# Patient Record
Sex: MALE | Race: WHITE | Employment: FULL TIME | RURAL
[De-identification: names, ages, dates, MRNs, and addresses within clinical notes are randomized per-mention and may not be internally consistent; named-entity substitution may affect disease eponyms.]

---

## 2017-08-04 ENCOUNTER — OFFICE VISIT (OUTPATIENT)
Dept: FAMILY MEDICINE CLINIC | Age: 67
End: 2017-08-04

## 2017-08-04 VITALS
WEIGHT: 168 LBS | HEART RATE: 60 BPM | RESPIRATION RATE: 16 BRPM | OXYGEN SATURATION: 98 % | HEIGHT: 70 IN | SYSTOLIC BLOOD PRESSURE: 158 MMHG | DIASTOLIC BLOOD PRESSURE: 86 MMHG | BODY MASS INDEX: 24.05 KG/M2

## 2017-08-04 DIAGNOSIS — E78.2 MIXED HYPERLIPIDEMIA: ICD-10-CM

## 2017-08-04 DIAGNOSIS — Z23 ENCOUNTER FOR IMMUNIZATION: ICD-10-CM

## 2017-08-04 DIAGNOSIS — I10 ESSENTIAL HYPERTENSION: ICD-10-CM

## 2017-08-04 DIAGNOSIS — Z00.00 MEDICARE ANNUAL WELLNESS VISIT, SUBSEQUENT: Primary | ICD-10-CM

## 2017-08-04 DIAGNOSIS — R35.1 NOCTURIA: ICD-10-CM

## 2017-08-04 RX ORDER — LOSARTAN POTASSIUM 50 MG/1
50 TABLET ORAL
Qty: 90 TAB | Refills: 4 | Status: SHIPPED | OUTPATIENT
Start: 2017-08-04 | End: 2018-10-15 | Stop reason: SDUPTHER

## 2017-08-04 RX ORDER — LOSARTAN POTASSIUM 50 MG/1
50 TABLET ORAL
Qty: 14 TAB | Refills: 1 | Status: SHIPPED | OUTPATIENT
Start: 2017-08-04 | End: 2017-08-04 | Stop reason: SDUPTHER

## 2017-08-04 NOTE — PROGRESS NOTES
Chief Complaint   Patient presents with   [facility] Well Male         HPI:      Dhiraj Camacho is a 78 y.o. male who has a long history of hyperlipidemia. A few years ago, he had a calcium score heart scan which showed some calcifications. He was placed on simvastatin 10 mg daily and has not had any side effects. His family history is remarkable for longevity. Mr. Michelle Aguilar walks every day and takes simvastatin as his only medication.       He had an episode of rectal bleeding which has Not recurred since stopping aspirin. He has had 2 normal colonoscopies. There is no family history colon cancer. No Known Allergies    Current Outpatient Prescriptions   Medication Sig    simvastatin (ZOCOR) 10 mg tablet TAKE 1 TABLET NIGHTLY    vit B Cmplx 3-FA-Vit C-Biotin (NEPHRO CARRILLO RX) 1- mg-mg-mcg tablet Take 1 Tab by mouth daily.  calcium polycarbophil (FIBERCON) 625 mg tablet Take 625 mg by mouth daily.  GLUCOSAMINE HCL/CHONDR CALDWELL A NA (GLUCOSAMINE-CHONDROITIN) 750-600 mg tab Take  by mouth.  cholecalciferol, vitamin D3, (VITAMIN D3) 2,000 unit tab Take  by mouth. No current facility-administered medications for this visit. Past Medical History:   Diagnosis Date    Mixed hyperlipidemia          ROS:  Denies fever, chills, cough, chest pain, SOB,  nausea, vomiting, or diarrhea. Denies wt loss, wt gain, hemoptysis, hematochezia or melena. Physical Examination:    /86 (BP 1 Location: Left arm, BP Patient Position: Sitting)  Pulse 60  Resp 16  Ht 5' 10\" (1.778 m)  Wt 168 lb (76.2 kg)  SpO2 98%  BMI 24.11 kg/m2    General: Alert and Ox3, Fluent speech  HEENT:  NC/AT, EOMI, OP: clear  Neck:  Supple, no adenopathy, JVD, mass or bruit  Chest:  Clear to Ausculation, without wheezes, rales, rubs or ronchi  Cardiac: RRR  Abdomen:  +BS, soft, nontender without palpable HSM  Extremities:  No cyanosis, clubbing or edema  Neurologic:  Ambulatory without assist, CN 2-12 grossly intact.   Moves all extremities. Skin: no rash  Lymphadenopathy: no cervical or supraclavicular nodes      ASSESSMENT AND PLAN:     1. Hyperlipidemia:He is doing very well with simvastatin. I have asked him to remain on this and we will check his labs today including a CBC, CMP, lipid profile, as well as a PSA.     2. Screening for prostate cancer today with a PSA. 3.  HTN:  This is real.  I checked his BP standing in both arms while standing obtaining values of 191-286 systolic. Check BP at home. Bring cuff in at next visit. Goal SBP < 140. Orders Placed This Encounter    PNEUMOCOCCAL POLYSACCHARIDE VACCINE, 23-VALENT, ADULT OR IMMUNOSUPPRESSED PT DOSE,    ADMIN PNEUMOCOCCAL VACCINE       Ramon Agarwal MD, FACP        ______________________________________________________________________    Izabel Lorena is a 78 y.o. male and presents for annual Medicare Wellness Visit. Problem List: Reviewed with patient and discussed risk factors. Patient Active Problem List   Diagnosis Code    Mixed hyperlipidemia E78.2       Current medical providers:  Patient Care Team:  Leon Ruffin MD as PCP - General (Internal Medicine)    PM, , Medications/Allergies: reviewed, on chart. Male Alcohol Screening: On any occasion during the past 3 months, have you had more than 4 drinks containing alcohol? No    Do you average more than 14 drinks per week? No    ROS:  Constitutional: No fever, chills or weight loss  Respiratory: No cough, SOB   CV: No chest pain or Palpitations    Objective:  Visit Vitals    /86 (BP 1 Location: Left arm, BP Patient Position: Sitting)    Pulse 60    Resp 16    Ht 5' 10\" (1.778 m)    Wt 168 lb (76.2 kg)    SpO2 98%    BMI 24.11 kg/m2    Body mass index is 24.11 kg/(m^2). Assessment of cognitive impairment: Alert and oriented x 3    Depression Screen: No flowsheet data found. Fall Risk Assessment:  No flowsheet data found.     Functional Ability:   Does the patient exhibit a steady gait? yes   How long did it take the patient to get up and walk from a sitting position? 12 sec   Is the patient self reliant?  (ie can do own laundry, meals, household chores)  yes     Does the patient handle his/her own medications? yes     Does the patient handle his/her own money? yes     Is the patients home safe (ie good lighting, handrails on stairs and bath, etc.)? yes     Did you notice or did patient express any hearing difficulties? yes     Did you notice or did patient express any vision difficulties? no       Advance Care Planning:   Patient was offered the opportunity to discuss advance care planning:  yes     Does patient have an Advance Directive:  yes   If no, did you provide information on Caring Connections?  no       Plan:      Orders Placed This Encounter    PNEUMOCOCCAL POLYSACCHARIDE VACCINE, 23-VALENT, ADULT OR IMMUNOSUPPRESSED PT DOSE,    [address] Maintenance   Topic Date Due    Hepatitis C Screening  1950    COLONOSCOPY  05/17/1969    GLAUCOMA SCREENING Q2Y  05/17/2016    Pneumococcal 65+ Low/Medium Risk (2 of 2 - PPSV23) 08/02/2017    INFLUENZA AGE 9 TO ADULT  10/30/2017 (Originally 8/1/2017)    MEDICARE YEARLY EXAM  08/05/2018    DTaP/Tdap/Td series (2 - Td) 08/04/2027    ZOSTER VACCINE AGE 60>  Completed       *Patient verbalized understanding and agreement with the plan. A copy of the After Visit Summary with personalized health plan was given to the patient today.

## 2017-08-04 NOTE — ACP (ADVANCE CARE PLANNING)
He has an advanced directive and will bring this in for scanning. In the event that he is unable to speak for himself, please contact his wife, Piper Johnson; she can be reached at [phone].     Shikha Chin

## 2017-08-04 NOTE — MR AVS SNAPSHOT
Visit Information Date & Time Provider Department Dept. Phone Encounter #  
 8/4/2017  7:30 AM Jackelin Packer MD [address] [place] [address] [id number] Follow-up Instructions Return in about 1 year (around 8/4/2018). Follow-up and Disposition History Your Appointments 10/4/2017  9:00 AM  
ESTABLISHED PATIENT with Jackelin Packer MD  
[address] ([facility]) Appt Note: 2mo fu  
 [address] [facility] [address] [facility] [address] [phone]  
  
   
 [address] [facility] [address] [facility] [address] Upcoming Health Maintenance Date Due Hepatitis C Screening 1950 COLONOSCOPY 5/17/1969 GLAUCOMA SCREENING Q2Y 5/17/2016 Pneumococcal 65+ Low/Medium Risk (2 of 2 - PPSV23) 8/2/2017 INFLUENZA AGE 9 TO ADULT 10/30/2017* MEDICARE YEARLY EXAM 8/5/2018 DTaP/Tdap/Td series (2 - Td) 8/4/2027 *Topic was postponed. The date shown is not the original due date. Allergies as of 8/4/2017  Review Complete On: 8/4/2017 By: Jackelin Packer MD  
 No Known Allergies Current Immunizations  Reviewed on 8/4/2017 Name Date Influenza High Dose Vaccine PF 11/15/2016 Influenza Vaccine 10/21/2015, 9/26/2014 Pneumococcal Conjugate (PCV-13) 8/2/2016  8:32 AM  
 Pneumococcal Polysaccharide (PPSV-23) 8/4/2017  8:24 AM  
 Td 6/9/2014 Varicella Virus Vaccine 6/30/2013 Reviewed by Rusty Kilgore on 8/4/2017 at  8:25 AM  
You Were Diagnosed With   
  
 Codes Comments Medicare annual wellness visit, subsequent    -  Primary ICD-10-CM: Z00.00 ICD-9-CM: V70.0 Encounter for immunization     ICD-10-CM: Z63 ICD-9-CM: V03.89 Mixed hyperlipidemia     ICD-10-CM: E78.2 ICD-9-CM: 272.2 Nocturia     ICD-10-CM: R35.1 ICD-9-CM: 788.43 Essential hypertension     ICD-10-CM: I10 
ICD-9-CM: 401.9 Vitals BP Pulse Resp Height(growth percentile) Weight(growth percentile) SpO2 158/86 (BP 1 Location: Left arm, BP Patient Position: Sitting) 60 16 5' 10\" (1.778 m) 168 lb (76.2 kg) 98% BMI Smoking Status 24.11 kg/m2 Former Smoker BMI and BSA Data Body Mass Index Body Surface Area  
 24.11 kg/m 2 1.94 m 2 Preferred Pharmacy Pharmacy Name Phone [address] [phone] Your Updated Medication List  
  
   
This list is accurate as of: 8/4/17  8:32 AM.  Always use your most recent med list.  
  
  
  
  
 calcium polycarbophil 625 mg tablet Commonly known as:  Zana Hammer Take 625 mg by mouth daily. glucosamine-chondroitin 750-600 mg Tab Take  by mouth.  
  
 losartan 50 mg tablet Commonly known as:  COZAAR Take 1 Tab by mouth nightly. Indications: hypertension  
  
 simvastatin 10 mg tablet Commonly known as:  ZOCOR  
TAKE 1 TABLET NIGHTLY  
  
 vit B Cmplx 3-FA-Vit C-Biotin 1- mg-mg-mcg tablet Commonly known as:  NEPHRO CARRILLO RX Take 1 Tab by mouth daily. VITAMIN D3 2,000 unit Tab Generic drug:  cholecalciferol (vitamin D3) Take  by mouth. Prescriptions Sent to Pharmacy Refills  
 losartan (COZAAR) 50 mg tablet 4 Sig: Take 1 Tab by mouth nightly. Indications: hypertension Class: Normal  
 Pharmacy: [address], [address] Ph #: [phone] Route: Oral  
  
We Performed the Following ADMIN PNEUMOCOCCAL VACCINE [ [facility]] CBC WITH AUTOMATED DIFF [15982 CPT(R)] LIPID PANEL [27903 CPT(R)] METABOLIC PANEL, COMPREHENSIVE [41146 CPT(R)] PNEUMOCOCCAL POLYSACCHARIDE VACCINE, 23-VALENT, ADULT OR IMMUNOSUPPRESSED PT DOSE, [68330 CPT(R)] PROSTATE SPECIFIC AG (PSA) [id number] CPT(R)] Follow-up Instructions Return in about 1 year (around 8/4/2018). Introducing [facility]! Dear Jennifer Peng: Thank you for requesting a Indigio account. Our records indicate that you already have an active Indigio account. You can access your account anytime at [email or web address] Me [email or web address]. [employer]/Get Me Listed Did you know that you can access your hospital and ER discharge instructions at any time in Indigio? You can also review all of your test results from your hospital stay or ER visit. Additional Information If you have questions, please visit the Frequently Asked Questions section of the Indigio website at [email or web address] Me Listed. [employer]/Get Me Listed/. Remember, Indigio is NOT to be used for urgent needs. For medical emergencies, dial 911. Now available from your iPhone and Android! Please provide this summary of care documentation to your next provider. Your primary care clinician is listed as Gini Dawson. If you have any questions after today's visit, please call [phone].

## 2017-08-05 LAB
ALBUMIN SERPL-MCNC: 4.6 G/DL (ref 3.6–4.8)
ALBUMIN/GLOB SERPL: 2 {RATIO} (ref 1.2–2.2)
ALP SERPL-CCNC: 71 IU/L (ref 39–117)
ALT SERPL-CCNC: 13 IU/L (ref 0–44)
AST SERPL-CCNC: 23 IU/L (ref 0–40)
BASOPHILS # BLD AUTO: 0 X10E3/UL (ref 0–0.2)
BASOPHILS NFR BLD AUTO: 0 %
BILIRUB SERPL-MCNC: 0.4 MG/DL (ref 0–1.2)
BUN SERPL-MCNC: 9 MG/DL (ref 8–27)
BUN/CREAT SERPL: 12 (ref 10–24)
CALCIUM SERPL-MCNC: 9.6 MG/DL (ref 8.6–10.2)
CHLORIDE SERPL-SCNC: 99 MMOL/L (ref 96–106)
CHOLEST SERPL-MCNC: 176 MG/DL (ref 100–199)
CO2 SERPL-SCNC: 23 MMOL/L (ref 18–29)
CREAT SERPL-MCNC: 0.78 MG/DL (ref 0.76–1.27)
EOSINOPHIL # BLD AUTO: 0.2 X10E3/UL (ref 0–0.4)
EOSINOPHIL NFR BLD AUTO: 2 %
ERYTHROCYTE [DISTWIDTH] IN BLOOD BY AUTOMATED COUNT: 13.3 % (ref 12.3–15.4)
GLOBULIN SER CALC-MCNC: 2.3 G/DL (ref 1.5–4.5)
GLUCOSE SERPL-MCNC: 97 MG/DL (ref 65–99)
HCT VFR BLD AUTO: 43.2 % (ref 37.5–51)
HDLC SERPL-MCNC: 76 MG/DL
HGB BLD-MCNC: 14.3 G/DL (ref 12.6–17.7)
IMM GRANULOCYTES # BLD: 0 X10E3/UL (ref 0–0.1)
IMM GRANULOCYTES NFR BLD: 0 %
LDLC SERPL CALC-MCNC: 85 MG/DL (ref 0–99)
LYMPHOCYTES # BLD AUTO: 2.1 X10E3/UL (ref 0.7–3.1)
LYMPHOCYTES NFR BLD AUTO: 27 %
MCH RBC QN AUTO: 31.6 PG (ref 26.6–33)
MCHC RBC AUTO-ENTMCNC: 33.1 G/DL (ref 31.5–35.7)
MCV RBC AUTO: 95 FL (ref 79–97)
MONOCYTES # BLD AUTO: 0.7 X10E3/UL (ref 0.1–0.9)
MONOCYTES NFR BLD AUTO: 9 %
NEUTROPHILS # BLD AUTO: 4.6 X10E3/UL (ref 1.4–7)
NEUTROPHILS NFR BLD AUTO: 62 %
PLATELET # BLD AUTO: 195 X10E3/UL (ref 150–379)
POTASSIUM SERPL-SCNC: 4.1 MMOL/L (ref 3.5–5.2)
PROT SERPL-MCNC: 6.9 G/DL (ref 6–8.5)
PSA SERPL-MCNC: 1.3 NG/ML (ref 0–4)
RBC # BLD AUTO: 4.53 X10E6/UL (ref 4.14–5.8)
SODIUM SERPL-SCNC: 138 MMOL/L (ref 134–144)
TRIGL SERPL-MCNC: 74 MG/DL (ref 0–149)
VLDLC SERPL CALC-MCNC: 15 MG/DL (ref 5–40)
WBC # BLD AUTO: 7.5 X10E3/UL (ref 3.4–10.8)

## 2017-10-04 ENCOUNTER — OFFICE VISIT (OUTPATIENT)
Dept: FAMILY MEDICINE CLINIC | Age: 67
End: 2017-10-04

## 2017-10-04 VITALS
OXYGEN SATURATION: 98 % | HEART RATE: 62 BPM | BODY MASS INDEX: 23.35 KG/M2 | DIASTOLIC BLOOD PRESSURE: 66 MMHG | RESPIRATION RATE: 16 BRPM | SYSTOLIC BLOOD PRESSURE: 144 MMHG | WEIGHT: 162.7 LBS

## 2017-10-04 DIAGNOSIS — I10 ESSENTIAL HYPERTENSION: Primary | ICD-10-CM

## 2017-10-04 NOTE — MR AVS SNAPSHOT
Visit Information Date & Time Provider Department Dept. Phone Encounter #  
 10/4/2017  9:00 AM Felisha Curiel, [address] [id number] Upcoming Health Maintenance Date Due Hepatitis C Screening 1950 INFLUENZA AGE 9 TO ADULT 10/30/2017* MEDICARE YEARLY EXAM 8/5/2018 GLAUCOMA SCREENING Q2Y 8/8/2019 COLONOSCOPY 4/20/2022 DTaP/Tdap/Td series (2 - Td) 8/4/2027 *Topic was postponed. The date shown is not the original due date. Allergies as of 10/4/2017  Review Complete On: 10/4/2017 By: Felisha Curiel MD  
 No Known Allergies Current Immunizations  Reviewed on 8/4/2017 Name Date Influenza High Dose Vaccine PF 11/15/2016 Influenza Vaccine 10/21/2015, 9/26/2014 Pneumococcal Conjugate (PCV-13) 8/2/2016  8:32 AM  
 Pneumococcal Polysaccharide (PPSV-23) 8/4/2017  8:24 AM  
 Td 6/9/2014 Varicella Virus Vaccine 6/30/2013 Not reviewed this visit You Were Diagnosed With   
  
 Codes Comments Essential hypertension    -  Primary ICD-10-CM: I10 
ICD-9-CM: 401.9 Vitals BP Pulse Resp Weight(growth percentile) SpO2 BMI  
 144/66 (BP 1 Location: Left arm, BP Patient Position: Sitting) 62 16 162 lb 11.2 oz (73.8 kg) 98% 23.35 kg/m2 Smoking Status Former Smoker BMI and BSA Data Body Mass Index Body Surface Area  
 23.35 kg/m 2 1.91 m 2 Preferred Pharmacy Pharmacy Name Phone [address] [facility] [phone] Your Updated Medication List  
  
   
This list is accurate as of: 10/4/17  9:41 AM.  Always use your most recent med list.  
  
  
  
  
 calcium polycarbophil 625 mg tablet Commonly known as:  Mel Jones Take 625 mg by mouth daily. glucosamine-chondroitin 750-600 mg Tab Take  by mouth.  
  
 losartan 50 mg tablet Commonly known as:  COZAAR  
 Take 1 Tab by mouth nightly. Indications: hypertension  
  
 simvastatin 10 mg tablet Commonly known as:  ZOCOR  
TAKE 1 TABLET NIGHTLY  
  
 vit B Cmplx 3-FA-Vit C-Biotin 1- mg-mg-mcg tablet Commonly known as:  NEPHRO CARRILLO RX Take 1 Tab by mouth daily. VITAMIN D3 2,000 unit Tab Generic drug:  cholecalciferol (vitamin D3) Take  by mouth. Introducing [facility]! Dear Rina Velarde: Thank you for requesting a Secure Command account. Our records indicate that you already have an active Secure Command account. You can access your account anytime at [email or web address]. [employer] Did you know that you can access your hospital and ER discharge instructions at any time in Secure Command? You can also review all of your test results from your hospital stay or ER visit. Additional Information If you have questions, please visit the Frequently Asked Questions section of the Secure Command website at https://[employer]. [employer]/. Remember, Secure Command is NOT to be used for urgent needs. For medical emergencies, dial 911. Now available from your iPhone and Android! Please provide this summary of care documentation to your next provider. Your primary care clinician is listed as Nain Palencia. If you have any questions after today's visit, please call [phone].

## 2017-10-04 NOTE — PROGRESS NOTES
Chief Complaint   Patient presents with    Hypertension         HPI:      Carolyne Pereyra is a 78 y.o. male. Seen 2 months ago for SAWV and noted to be hypertensive. Instructed to check BP at home. New Issues:  Doing well on Losartan. Not dizzy. No back pain. Home BP values reviewed with patient today and these are all < 140. No Known Allergies    Current Outpatient Prescriptions   Medication Sig    losartan (COZAAR) 50 mg tablet Take 1 Tab by mouth nightly. Indications: hypertension    simvastatin (ZOCOR) 10 mg tablet TAKE 1 TABLET NIGHTLY    vit B Cmplx 3-FA-Vit C-Biotin (NEPHRO CARRILLO RX) 1- mg-mg-mcg tablet Take 1 Tab by mouth daily.  calcium polycarbophil (FIBERCON) 625 mg tablet Take 625 mg by mouth daily.  GLUCOSAMINE HCL/CHONDR CALDWELL A NA (GLUCOSAMINE-CHONDROITIN) 750-600 mg tab Take  by mouth.  cholecalciferol, vitamin D3, (VITAMIN D3) 2,000 unit tab Take  by mouth. No current facility-administered medications for this visit. Past Medical History:   Diagnosis Date    Mixed hyperlipidemia          ROS:  Denies fever, chills, cough, chest pain, SOB,  nausea, vomiting, or diarrhea. Denies wt loss, wt gain, hemoptysis, hematochezia or melena. Physical Examination:    /66 (BP 1 Location: Left arm, BP Patient Position: Sitting)  Pulse 62  Resp 16  Wt 162 lb 11.2 oz (73.8 kg)  SpO2 98%  BMI 23.35 kg/m2    General: Alert and Ox3, Fluent speech  HEENT:  NC/AT, EOMI, OP: clear  Neck:  Supple, no adenopathy, JVD, mass or bruit  Chest:  Clear to Ausculation, without wheezes, rales, rubs or ronchi  Cardiac: RRR  Abdomen:  +BS, soft, nontender without palpable HSM  Extremities:  No cyanosis, clubbing or edema  Neurologic:  Ambulatory without assist, CN 2-12 grossly intact. Moves all extremities. Skin: no rash  Lymphadenopathy: no cervical or supraclavicular nodes      ASSESSMENT AND PLAN:     1. Essential HTN:  Well controlled on Losartan without side effects. Continue current meds and RTC in a year. No orders of the defined types were placed in this encounter.       Joseph Norris MD, [address]

## 2017-10-05 ENCOUNTER — CLINICAL SUPPORT (OUTPATIENT)
Dept: FAMILY MEDICINE CLINIC | Age: 67
End: 2017-10-05

## 2017-10-05 DIAGNOSIS — Z23 ENCOUNTER FOR IMMUNIZATION: Primary | ICD-10-CM

## 2017-10-05 NOTE — MR AVS SNAPSHOT
Visit Information Date & Time Provider Department Dept. Phone Encounter #  
 10/5/2017 11:15 AM [facility] [address] [id number] Upcoming Health Maintenance Date Due Hepatitis C Screening 1950 MEDICARE YEARLY EXAM 8/5/2018 GLAUCOMA SCREENING Q2Y 8/8/2019 COLONOSCOPY 4/20/2022 DTaP/Tdap/Td series (2 - Td) 8/4/2027 Allergies as of 10/5/2017  Review Complete On: 10/4/2017 By: Anais Atkins MD  
 No Known Allergies Current Immunizations  Reviewed on 8/4/2017 Name Date Influenza High Dose Vaccine PF 10/5/2017 11:33 AM, 11/15/2016 Influenza Vaccine 10/21/2015, 9/26/2014 Pneumococcal Conjugate (PCV-13) 8/2/2016  8:32 AM  
 Pneumococcal Polysaccharide (PPSV-23) 8/4/2017  8:24 AM  
 Td 6/9/2014 Varicella Virus Vaccine 6/30/2013 Not reviewed this visit You Were Diagnosed With   
  
 Codes Comments Encounter for immunization    -  Primary ICD-10-CM: C35 ICD-9-CM: V03.89 Vitals Smoking Status Former Smoker Preferred Pharmacy Pharmacy Name Phone [address], [facility] [phone] Your Updated Medication List  
  
   
This list is accurate as of: 10/5/17 11:45 AM.  Always use your most recent med list.  
  
  
  
  
 calcium polycarbophil 625 mg tablet Commonly known as:  [facility] Take 625 mg by mouth daily. glucosamine-chondroitin 750-600 mg Tab Take  by mouth.  
  
 losartan 50 mg tablet Commonly known as:  COZAAR Take 1 Tab by mouth nightly. Indications: hypertension  
  
 simvastatin 10 mg tablet Commonly known as:  ZOCOR  
TAKE 1 TABLET NIGHTLY  
  
 vit B Cmplx 3-FA-Vit C-Biotin 1- mg-mg-mcg tablet Commonly known as:  NEPHRO CARRILLO RX Take 1 Tab by mouth daily. VITAMIN D3 2,000 unit Tab Generic drug:  cholecalciferol (vitamin D3) Take  by mouth. We Performed the Following INFLUENZA VIRUS VACCINE, HIGH DOSE SEASONAL, PRESERVATIVE FREE [28602 CPT(R)] Introducing [facility]! Dear Abelardo Soni: Thank you for requesting a [employer] account. Our records indicate that you already have an active [employer] account. You can access your account anytime at [email or web address] Did you know that you can access your hospital and ER discharge instructions at any time in [employer]? You can also review all of your test results from your hospital stay or ER visit. Additional Information If you have questions, please visit the Frequently Asked Questions section of the [employer] website at [email or web address] Remember, The Lo[employer] Tea is NOT to be used for urgent needs. For medical emergencies, dial 911. Now available from your iPhone and Android! Please provide this summary of care documentation to your next provider. Your primary care clinician is listed as Charbel Grave. If you have any questions after today's visit, please call [phone].

## 2018-02-19 ENCOUNTER — OFFICE VISIT (OUTPATIENT)
Dept: FAMILY MEDICINE CLINIC | Age: 68
End: 2018-02-19

## 2018-02-19 VITALS
HEIGHT: 70 IN | OXYGEN SATURATION: 99 % | SYSTOLIC BLOOD PRESSURE: 132 MMHG | WEIGHT: 163.4 LBS | BODY MASS INDEX: 23.39 KG/M2 | TEMPERATURE: 97.9 F | DIASTOLIC BLOOD PRESSURE: 78 MMHG | HEART RATE: 63 BPM | RESPIRATION RATE: 18 BRPM

## 2018-02-19 DIAGNOSIS — R09.89 CHEST CONGESTION: Primary | ICD-10-CM

## 2018-02-19 RX ORDER — ALBUTEROL SULFATE 0.83 MG/ML
2.5 SOLUTION RESPIRATORY (INHALATION) ONCE
Qty: 1 EACH | Refills: 0
Start: 2018-02-19 | End: 2018-02-19

## 2018-02-19 NOTE — MR AVS SNAPSHOT
[address] 
[phone] Patient: Fernando White MRN: [id number] Visit Information Date & Time Provider Department Dept. Phone Encounter #  
 2/19/2018  8:45 AM ANGELES Ignacio [facility] [address] [facility] [phone] [id number] Upcoming Health Maintenance Date Due Hepatitis C Screening 1950 MEDICARE YEARLY EXAM 8/5/2018 GLAUCOMA SCREENING Q2Y 8/8/2019 COLONOSCOPY 4/20/2022 DTaP/Tdap/Td series (2 - Td) 8/4/2027 Allergies as of 2/19/2018  Review Complete On: 2/19/2018 By: Krystina Morales RN No Known Allergies Current Immunizations  Reviewed on 8/4/2017 Name Date Influenza High Dose Vaccine PF 10/5/2017 11:33 AM, 11/15/2016 Influenza Vaccine 10/21/2015, 9/26/2014 Pneumococcal Conjugate (PCV-13) 8/2/2016  8:32 AM  
 Pneumococcal Polysaccharide (PPSV-23) 8/4/2017  8:24 AM  
 Td 6/9/2014 Varicella Virus Vaccine 6/30/2013 Not reviewed this visit You Were Diagnosed With   
  
 Codes Comments Chest congestion    -  Primary ICD-10-CM: R09.89 ICD-9-CM: 786.9 BMI 23.0-23.9, adult     ICD-10-CM: Z68.23 
ICD-9-CM: V85.1 Vitals BP Pulse Temp Resp Height(growth percentile) 132/78 (BP 1 Location: Left arm, BP Patient Position: Sitting) 63 97.9 °F (36.6 °C) (Temporal) 18 5' 10\" (1.778 m) Weight(growth percentile) SpO2 BMI Smoking Status 163 lb 6.4 oz (74.1 kg) 99% 23.45 kg/m2 Former Smoker Vitals History BMI and BSA Data Body Mass Index Body Surface Area  
 23.45 kg/m 2 1.91 m 2 Preferred Pharmacy Pharmacy Name Phone [address] [phone] Your Updated Medication List  
  
   
This list is accurate as of: 2/19/18 11:01 AM.  Always use your most recent med list.  
  
  
  
  
 albuterol 2.5 mg /3 mL (0.083 %) nebulizer solution Commonly known as:  PROVENTIL VENTOLIN  
3 mL by Nebulization route once for 1 dose. calcium polycarbophil 625 mg tablet Commonly known as:  Samantha Clancy Take 625 mg by mouth daily. glucosamine-chondroitin 750-600 mg Tab Take  by mouth.  
  
 losartan 50 mg tablet Commonly known as:  COZAAR Take 1 Tab by mouth nightly. Indications: hypertension PROBIOTIC 4X 10-15 mg Tbec Generic drug:  B.infantis-B.ani-B.long-B.bifi Take  by mouth. simvastatin 10 mg tablet Commonly known as:  ZOCOR  
TAKE 1 TABLET NIGHTLY  
  
 vit B Cmplx 3-FA-Vit C-Biotin 1- mg-mg-mcg tablet Commonly known as:  NEPHRO CARRILLO RX Take 1 Tab by mouth daily. VITAMIN D3 2,000 unit Tab Generic drug:  cholecalciferol (vitamin D3) Take  by mouth. We Performed the Following ALBUTEROL, INHAL. SOL., FDA-APPROVED FINAL, NON-COMPOUND UNIT DOSE, 1 MG [ [facility]] Introducing [facility]! Dear Duane Comes: Thank you for requesting a LaserLeap account. Our records indicate that you already have an active LaserLeap account. You can access your account anytime at [email or web address] Did you know that you can access your hospital and ER discharge instructions at any time in LaserLeap? You can also review all of your test results from your hospital stay or ER visit. Additional Information If you have questions, please visit the Frequently Asked Questions section of the LaserLeap website at [email or web address] Remember, LaserLeap is NOT to be used for urgent needs. For medical emergencies, dial 911. Now available from your iPhone and Android! Please provide this summary of care documentation to your next provider. Your primary care clinician is listed as Mary Guallpa. If you have any questions after today's visit, please call [phone].

## 2018-02-20 NOTE — PROGRESS NOTES
Subjective:   Fiorella Samayoa is a 78 y.o. male who complains of coryza, congestion, sore throat, nasal blockage and productive cough for 10 days, gradually worsening since that time. He positive  history of shortness of breath and wheezing. Evaluation to date: none. Treatment to date: decongestants, cough suppressants, OTC products. Patient does not smoke cigarettes. Relevant PMH: No pertinent additional PMH. Patient Active Problem List   Diagnosis Code    Mixed hyperlipidemia E78.2    Essential hypertension I10     Patient Active Problem List    Diagnosis Date Noted    Essential hypertension 10/04/2017    Mixed hyperlipidemia      Current Outpatient Prescriptions   Medication Sig Dispense Refill    B.infantis-B.ani-B.long-B.bifi (PROBIOTIC 4X) 10-15 mg TbEC Take  by mouth.  losartan (COZAAR) 50 mg tablet Take 1 Tab by mouth nightly. Indications: hypertension 90 Tab 4    simvastatin (ZOCOR) 10 mg tablet TAKE 1 TABLET NIGHTLY 90 Tab 4    vit B Cmplx 3-FA-Vit C-Biotin (NEPHRO CARRILLO RX) 1- mg-mg-mcg tablet Take 1 Tab by mouth daily.  calcium polycarbophil (FIBERCON) 625 mg tablet Take 625 mg by mouth daily.  GLUCOSAMINE HCL/CHONDR CALDWELL A NA (GLUCOSAMINE-CHONDROITIN) 750-600 mg tab Take  by mouth.  cholecalciferol, vitamin D3, (VITAMIN D3) 2,000 unit tab Take  by mouth.        No Known Allergies  Past Medical History:   Diagnosis Date    HTN (hypertension)     Mixed hyperlipidemia      Past Surgical History:   Procedure Laterality Date    HX COLONOSCOPY  04/2012    HX HERNIA REPAIR Right     inguinal    HX VASECTOMY       Family History   Problem Relation Age of Onset    Cancer Mother     Diabetes Father      Social History   Substance Use Topics    Smoking status: Former Smoker     Quit date: 7/31/1981    Smokeless tobacco: Never Used    Alcohol use 0.0 oz/week     0 Standard drinks or equivalent per week        Review of Systems  Pertinent items are noted in HPI.    Objective:     Visit Vitals    /78 (BP 1 Location: Left arm, BP Patient Position: Sitting)    Pulse 63    Temp 97.9 °F (36.6 °C) (Temporal)    Resp 18    Ht 5' 10\" (1.778 m)    Wt 163 lb 6.4 oz (74.1 kg)    SpO2 99%    BMI 23.45 kg/m2     General:  alert, cooperative, no distress   Eyes: negative   Ears: normal TM's and external ear canals AU   Sinuses: Normal paranasal sinuses without tenderness   Mouth:  Lips, mucosa, and tongue normal. Teeth and gums normal   Neck: supple, symmetrical, trachea midline and no adenopathy. Heart: S1 and S2 normal, no murmurs noted. Lungs: wheezes inleft base  prior to nebulizer clear on auscultation post neb   Abdomen: soft, non-tender. Bowel sounds normal. No masses,  no organomegaly        Assessment/Plan:   viral upper respiratory illness  Suggested symptomatic OTC remedies. RTC prn. Discussed diagnosis and treatment of viral URIs. Discussed the importance of avoiding unnecessary antibiotic therapy. ICD-10-CM ICD-9-CM    1. Chest congestion R09.89 786.9 ALBUTEROL, INHAL. SOL., FDA-APPROVED FINAL, NON-COMPOUND UNIT DOSE, 1 MG   2. BMI 23.0-23.9, adult Z68.23 V85.1      Encounter Diagnoses   Name Primary?  Chest congestion Yes    BMI 23.0-23.9, adult      Diagnoses and all orders for this visit:    1. Chest congestion  -     ALBUTEROL, INHAL. TOD.()    2. BMI 23.0-23.9, adult    Other orders  -     albuterol (PROVENTIL VENTOLIN) 2.5 mg /3 mL (0.083 %) nebulizer solution; 3 mL by Nebulization route once for 1 dose.     Sonia PERERA

## 2018-05-21 RX ORDER — SIMVASTATIN 10 MG/1
TABLET, FILM COATED ORAL
Qty: 90 TAB | Refills: 4 | Status: SHIPPED | OUTPATIENT
Start: 2018-05-21 | End: 2019-05-22 | Stop reason: SDUPTHER

## 2018-10-15 RX ORDER — LOSARTAN POTASSIUM 50 MG/1
TABLET ORAL
Qty: 90 TAB | Refills: 4 | Status: SHIPPED | OUTPATIENT
Start: 2018-10-15 | End: 2020-01-10

## 2020-04-20 RX ORDER — LOSARTAN POTASSIUM 50 MG/1
50 TABLET ORAL
Qty: 60 TAB | Refills: 4 | Status: SHIPPED | OUTPATIENT
Start: 2020-04-20 | End: 2021-04-04

## 2020-06-09 ENCOUNTER — OFFICE VISIT (OUTPATIENT)
Dept: PRIMARY CARE CLINIC | Age: 70
End: 2020-06-09

## 2020-06-09 DIAGNOSIS — Z20.828 EXPOSURE TO SARS-ASSOCIATED CORONAVIRUS: Primary | ICD-10-CM

## 2020-06-09 NOTE — PROGRESS NOTES
Pt presents to the covid clinic today requesting a covid test. Pt denies symptoms and exposure at this.

## 2020-06-12 LAB — SARS-COV-2, NAA: NOT DETECTED

## 2021-06-13 NOTE — PROGRESS NOTES
Mr. Franco Sorto is a 80 y.o. male who is here for evaluation of No chief complaint on file. .       ASSESSMENT AND PLAN:    1. Encounter for subsequent annual wellness visit (AWV) in Medicare patient  Completed below    2. Nocturia  Due for psa    3. Encounter for hepatitis C screening test for low risk patient  4. Mixed hyperlipidemia  Due for labs    5. Essential hypertension  At goal--continue Losartan      No orders of the defined types were placed in this encounter. HPI  79 yo with history of well controlled HTN and lipids. Arrives for wellness exam, labs and med review. ROS:  Denies fever, chills, cough, chest pain, SOB,  nausea, vomiting, or diarrhea. Denies wt loss, wt gain, hemoptysis, hematochezia or melena. Physical Examination:    There were no vitals taken for this visit. General:  Alert, cooperative, no distress. Head:  Normocephalic, without obvious abnormality, atraumatic. Eyes:  Conjunctivae/corneas clear. Pupils equal, round, reactive to light. Extraocular movements intact. Lungs:   Clear to auscultation bilaterally. Chest wall:  No tenderness or deformity. Cardiac:  RRR   Abdomen:   Soft, non-tender. Bowel sounds normal. No masses. No organomegaly. Extremities: Extremities normal, atraumatic, no cyanosis or edema. Pulses: 2+ and symmetric all extremities. Skin: Skin color, texture, turgor normal. No rashes or lesions. Lymph nodes: Cervical, supraclavicular, and axillary nodes normal.   Neurologic: CNII-XII intact. Normal strength, sensation, and reflexes throughout. On this date 06/14/2021 I have spent 30 minutes reviewing previous notes, test results and face to face with the patient discussing the diagnosis and importance of compliance with the treatment plan as well as documenting on the day of the visit.     Avinash Dela Cruz MD FACP    (signed electronically) on 6/13/2021 at 4:28 PM            ______________________________________________________________________    Lenny Breen is a 80 y.o. male and presents for annual Medicare Wellness Visit. Problem List: Reviewed with patient and discussed risk factors. Patient Active Problem List   Diagnosis Code    Mixed hyperlipidemia E78.2    Essential hypertension I10       Current medical providers:  Patient Care Team:  Mia Durham MD as PCP - General (Internal Medicine)  Mia Durham MD as PCP - [facility] Antonino Crespo Provider    [facility], , Medications/Allergies: reviewed, on chart. Male Alcohol Screening: On any occasion during the past 3 months, have you had more than 4 drinks containing alcohol? No    Do you average more than 14 drinks per week? No    Objective:  Visit Vitals  /72 (BP 1 Location: Left upper arm, BP Patient Position: Sitting, BP Cuff Size: Adult long)   Pulse 72   Temp 97.4 °F (36.3 °C) (Temporal)   Resp 17   Ht 5' 10\" (1.778 m)   Wt 163 lb 9.6 oz (74.2 kg)   SpO2 96%   BMI 23.47 kg/m²    Body mass index is 23.47 kg/m². Assessment of cognitive impairment: Alert and oriented x 3    Depression Screen:   3 most recent PHQ Screens 6/14/2021   Little interest or pleasure in doing things Not at all   Feeling down, depressed, irritable, or hopeless Not at all   Total Score PHQ 2 0       Fall Risk Assessment:    Fall Risk Assessment, last 12 mths 6/14/2021   Able to walk? Yes   Fall in past 12 months? 0   Do you feel unsteady? 0   Are you worried about falling 0       Functional Ability:   Does the patient exhibit a steady gait? yes   How long did it take the patient to get up and walk from a sitting position? 12 sec   Is the patient self reliant?  (ie can do own laundry, meals, household chores)  yes     Does the patient handle his/her own medications? yes     Does the patient handle his/her own money? yes     Is the patients home safe (ie good lighting, handrails on stairs and bath, etc.)?    yes Did you notice or did patient express any hearing difficulties? yes     Did you notice or did patient express any vision difficulties? no       Advance Care Planning:   Patient was offered the opportunity to discuss advance care planning:  yes     Does patient have an Advance Directive:  yes   If no, did you provide information on Caring Connections?  no       Plan:      Orders Placed This Encounter    CBC WITH AUTOMATED DIFF    METABOLIC PANEL, COMPREHENSIVE    LIPID PANEL    PROSTATE SPECIFIC AG    TSH 3RD GENERATION    HEPATITIS C AB    COLOGUARD TEST (FECAL DNA COLORECTAL CANCER SCREENING)       Health Maintenance   Topic Date Due    Hepatitis C Screening  Never done    Shingrix Vaccine Age 50> (1 of 2) Never done    Lipid Screen  08/04/2018    Colorectal Cancer Screening Combo  04/20/2022    DTaP/Tdap/Td series (2 - Td or Tdap) 08/04/2027    Flu Vaccine  Completed    COVID-19 Vaccine  Completed    Pneumococcal 65+ years  Completed       *Patient verbalized understanding and agreement with the plan. A copy of the After Visit Summary with personalized health plan was given to the patient today.

## 2021-06-14 ENCOUNTER — OFFICE VISIT (OUTPATIENT)
Dept: FAMILY MEDICINE CLINIC | Age: 71
End: 2021-06-14
Payer: COMMERCIAL

## 2021-06-14 VITALS
WEIGHT: 163.6 LBS | HEART RATE: 72 BPM | BODY MASS INDEX: 23.42 KG/M2 | SYSTOLIC BLOOD PRESSURE: 118 MMHG | TEMPERATURE: 97.4 F | OXYGEN SATURATION: 96 % | RESPIRATION RATE: 17 BRPM | DIASTOLIC BLOOD PRESSURE: 72 MMHG | HEIGHT: 70 IN

## 2021-06-14 DIAGNOSIS — Z00.00 ENCOUNTER FOR SUBSEQUENT ANNUAL WELLNESS VISIT (AWV) IN MEDICARE PATIENT: Primary | ICD-10-CM

## 2021-06-14 DIAGNOSIS — R35.1 NOCTURIA: ICD-10-CM

## 2021-06-14 DIAGNOSIS — I10 ESSENTIAL HYPERTENSION: ICD-10-CM

## 2021-06-14 DIAGNOSIS — Z11.59 ENCOUNTER FOR HEPATITIS C SCREENING TEST FOR LOW RISK PATIENT: ICD-10-CM

## 2021-06-14 DIAGNOSIS — Z12.11 ENCOUNTER FOR SCREENING FOR MALIGNANT NEOPLASM OF COLON: ICD-10-CM

## 2021-06-14 DIAGNOSIS — E78.2 MIXED HYPERLIPIDEMIA: ICD-10-CM

## 2021-06-14 LAB
ALBUMIN SERPL-MCNC: 4.3 G/DL (ref 3.5–5)
ALBUMIN/GLOB SERPL: 1.5 {RATIO} (ref 1.1–2.2)
ALP SERPL-CCNC: 90 U/L (ref 45–117)
ALT SERPL-CCNC: 25 U/L (ref 12–78)
ANION GAP SERPL CALC-SCNC: 7 MMOL/L (ref 5–15)
AST SERPL-CCNC: 27 U/L (ref 15–37)
BASOPHILS # BLD: 0 K/UL (ref 0–0.1)
BASOPHILS NFR BLD: 0 % (ref 0–1)
BILIRUB SERPL-MCNC: 0.4 MG/DL (ref 0.2–1)
BUN SERPL-MCNC: 12 MG/DL (ref 6–20)
BUN/CREAT SERPL: 17 (ref 12–20)
CALCIUM SERPL-MCNC: 9.9 MG/DL (ref 8.5–10.1)
CHLORIDE SERPL-SCNC: 103 MMOL/L (ref 97–108)
CHOLEST SERPL-MCNC: 194 MG/DL
CO2 SERPL-SCNC: 27 MMOL/L (ref 21–32)
CREAT SERPL-MCNC: 0.7 MG/DL (ref 0.7–1.3)
DIFFERENTIAL METHOD BLD: ABNORMAL
EOSINOPHIL # BLD: 0.2 K/UL (ref 0–0.4)
EOSINOPHIL NFR BLD: 3 % (ref 0–7)
ERYTHROCYTE [DISTWIDTH] IN BLOOD BY AUTOMATED COUNT: 12.5 % (ref 11.5–14.5)
GLOBULIN SER CALC-MCNC: 2.8 G/DL (ref 2–4)
GLUCOSE SERPL-MCNC: 137 MG/DL (ref 65–100)
HCT VFR BLD AUTO: 42.7 % (ref 36.6–50.3)
HCV AB SERPL QL IA: NONREACTIVE
HCV COMMENT,HCGAC: NORMAL
HDLC SERPL-MCNC: 68 MG/DL
HDLC SERPL: 2.9 {RATIO} (ref 0–5)
HGB BLD-MCNC: 14.1 G/DL (ref 12.1–17)
IMM GRANULOCYTES # BLD AUTO: 0 K/UL (ref 0–0.04)
IMM GRANULOCYTES NFR BLD AUTO: 0 % (ref 0–0.5)
LDLC SERPL CALC-MCNC: 99.6 MG/DL (ref 0–100)
LYMPHOCYTES # BLD: 1.9 K/UL (ref 0.8–3.5)
LYMPHOCYTES NFR BLD: 27 % (ref 12–49)
MCH RBC QN AUTO: 32.9 PG (ref 26–34)
MCHC RBC AUTO-ENTMCNC: 33 G/DL (ref 30–36.5)
MCV RBC AUTO: 99.5 FL (ref 80–99)
MONOCYTES # BLD: 0.5 K/UL (ref 0–1)
MONOCYTES NFR BLD: 6 % (ref 5–13)
NEUTS SEG # BLD: 4.4 K/UL (ref 1.8–8)
NEUTS SEG NFR BLD: 64 % (ref 32–75)
NRBC # BLD: 0 K/UL (ref 0–0.01)
NRBC BLD-RTO: 0 PER 100 WBC
PLATELET # BLD AUTO: 211 K/UL (ref 150–400)
PMV BLD AUTO: 11.7 FL (ref 8.9–12.9)
POTASSIUM SERPL-SCNC: 4.1 MMOL/L (ref 3.5–5.1)
PROT SERPL-MCNC: 7.1 G/DL (ref 6.4–8.2)
PSA SERPL-MCNC: 1.3 NG/ML (ref 0.01–4)
RBC # BLD AUTO: 4.29 M/UL (ref 4.1–5.7)
SODIUM SERPL-SCNC: 137 MMOL/L (ref 136–145)
TRIGL SERPL-MCNC: 132 MG/DL (ref ?–150)
TSH SERPL DL<=0.05 MIU/L-ACNC: 2.25 UIU/ML (ref 0.36–3.74)
VLDLC SERPL CALC-MCNC: 26.4 MG/DL
WBC # BLD AUTO: 7 K/UL (ref 4.1–11.1)

## 2021-06-14 PROCEDURE — 99397 PER PM REEVAL EST PAT 65+ YR: CPT | Performed by: INTERNAL MEDICINE

## 2021-06-14 NOTE — PROGRESS NOTES
Ki Alejandro is a 80 y.o. male presenting for/with:    Chief Complaint   Patient presents with    Hypertension    Follow-up     Routine F/U without C/O       Visit Vitals  /72 (BP 1 Location: Left upper arm, BP Patient Position: Sitting, BP Cuff Size: Adult long)   Pulse 72   Temp 97.4 °F (36.3 °C) (Temporal)   Resp 17   Ht 5' 10\" (1.778 m)   Wt 163 lb 9.6 oz (74.2 kg)   SpO2 96%   BMI 23.47 kg/m²     Pain Scale: 0 - No pain/10  Pain Location:     1. Have you been to the ER, urgent care clinic since your last visit? Hospitalized since your last visit? NO    2. Have you seen or consulted any other health care providers outside of the [address] [place] [address] since your last visit? Include any pap smears or colon screening. NO    Symptom review:  NO  Fever   NO  Shaking chills  NO  Cough  NO  Body aches  NO  Coughing up blood  NO  Chest congestion  NO  Chest pain  NO  Shortness of breath  NO  Profound Loss of smell/taste  NO  Nausea/Vomiting   NO  Loose stool/Diarrhea  NO  any skin issues    Patient Risk Factors Reviewed as follows:  NO  have you been in Close contact with confirmed COVID19 patient   NO  History of recent travel to affected geographical areas within the past 14 days  NO  COPD  NO  Active Cancer/Leukemia/Lymphoma/Chemotherapy  NO  Oral steroid use  NO  Pregnant  NO  Diabetes Mellitus  YES  Heart disease  NO  Asthma  NO Health care worker at home  [address] care worker  NO Is there a Pregnant Woman in the home  NO Dialysis pt in the home   NO a large number of people living in the home    Learning Assessment 6/14/2021   PRIMARY LEARNER Patient   PRIMARY LANGUAGE ENGLISH   LEARNER PREFERENCE PRIMARY LISTENING   ANSWERED BY patient   RELATIONSHIP SELF     Fall Risk Assessment, last 12 mths 6/14/2021   Able to walk? Yes   Fall in past 12 months? 0   Do you feel unsteady?  0   Are you worried about falling 0       3 most recent PHQ Screens 6/14/2021   Little interest or pleasure in doing things Not at all   Feeling down, depressed, irritable, or hopeless Not at all   Total Score PHQ 2 0     Abuse Screening Questionnaire 6/14/2021   Do you ever feel afraid of your partner? N   Are you in a relationship with someone who physically or mentally threatens you? N   Is it safe for you to go home?  Y       ADL Assessment 6/14/2021   Feeding yourself No Help Needed   Getting from bed to chair No Help Needed   Getting dressed No Help Needed   Bathing or showering No Help Needed   Walk across the room (includes cane/walker) No Help Needed   Using the telphone No Help Needed   Taking your medications No Help Needed   Preparing meals No Help Needed   Managing money (expenses/bills) No Help Needed   Moderately strenuous housework (laundry) No Help Needed   Shopping for personal items (toiletries/medicines) No Help Needed   Shopping for groceries No Help Needed   Driving No Help Needed   Climbing a flight of stairs No Help Needed   Getting to places beyond walking distances No Help Needed      Advance directive on file and emergency contacts verified

## 2021-06-15 DIAGNOSIS — E11.9 CONTROLLED TYPE 2 DIABETES MELLITUS WITHOUT COMPLICATION, WITHOUT LONG-TERM CURRENT USE OF INSULIN (HCC): Primary | ICD-10-CM

## 2021-06-15 DIAGNOSIS — E11.9 CONTROLLED TYPE 2 DIABETES MELLITUS WITHOUT COMPLICATION, WITHOUT LONG-TERM CURRENT USE OF INSULIN (HCC): ICD-10-CM

## 2021-06-15 LAB
EST. AVERAGE GLUCOSE BLD GHB EST-MCNC: 94 MG/DL
HBA1C MFR BLD: 4.9 % (ref 4–5.6)

## 2021-08-27 ENCOUNTER — OFFICE VISIT (OUTPATIENT)
Dept: FAMILY MEDICINE CLINIC | Age: 71
End: 2021-08-27
Payer: COMMERCIAL

## 2021-08-27 DIAGNOSIS — Z23 ENCOUNTER FOR IMMUNIZATION: Primary | ICD-10-CM

## 2021-08-27 PROCEDURE — 90471 IMMUNIZATION ADMIN: CPT | Performed by: FAMILY MEDICINE

## 2021-08-27 PROCEDURE — 90750 HZV VACC RECOMBINANT IM: CPT | Performed by: FAMILY MEDICINE

## 2021-10-29 ENCOUNTER — OFFICE VISIT (OUTPATIENT)
Dept: FAMILY MEDICINE CLINIC | Age: 71
End: 2021-10-29
Payer: COMMERCIAL

## 2021-10-29 DIAGNOSIS — Z23 ENCOUNTER FOR IMMUNIZATION: Primary | ICD-10-CM

## 2021-10-29 PROCEDURE — 90471 IMMUNIZATION ADMIN: CPT | Performed by: FAMILY MEDICINE

## 2021-10-29 PROCEDURE — 90750 HZV VACC RECOMBINANT IM: CPT | Performed by: FAMILY MEDICINE

## 2021-10-29 NOTE — PROGRESS NOTES
After obtaining consent, and per orders of Dr. Mago Potter, injection of Shingrix given by Chiqui Villafuerte. Patient instructed to remain in clinic for 20 minutes afterwards, and to report any adverse reaction to me immediately.

## 2021-11-12 ENCOUNTER — OFFICE VISIT (OUTPATIENT)
Dept: FAMILY MEDICINE CLINIC | Age: 71
End: 2021-11-12
Payer: COMMERCIAL

## 2021-11-12 DIAGNOSIS — Z23 NEEDS FLU SHOT: Primary | ICD-10-CM

## 2021-11-12 PROCEDURE — 90674 CCIIV4 VAC NO PRSV 0.5 ML IM: CPT | Performed by: FAMILY MEDICINE

## 2021-11-12 PROCEDURE — 90471 IMMUNIZATION ADMIN: CPT | Performed by: FAMILY MEDICINE

## 2021-11-12 NOTE — PROGRESS NOTES
After obtaining consent, and per orders of DrBavuso injection of Fluad given by Josselyn Mauricio. Patient instructed to remain in clinic for 20 minutes afterwards, and to report any adverse reaction to me immediately.

## 2021-11-12 NOTE — PATIENT INSTRUCTIONS
Vaccine Information Statement    Influenza (Flu) Vaccine (Inactivated or Recombinant): What You Need to Know    Many vaccine information statements are available in Khmer and other languages. See www.immunize.org/vis. Hojas de información sobre vacunas están disponibles en español y en muchos otros idiomas. Visite www.immunize.org/vis. 1. Why get vaccinated? Influenza vaccine can prevent influenza (flu). Flu is a contagious disease that spreads around the [facility] every year, usually between October and May. Anyone can get the flu, but it is more dangerous for some people. Infants and young children, people 72 years and older, pregnant people, and people with certain health conditions or a weakened immune system are at greatest risk of flu complications. Pneumonia, bronchitis, sinus infections, and ear infections are examples of flu-related complications. If you have a medical condition, such as heart disease, cancer, or diabetes, flu can make it worse. Flu can cause fever and chills, sore throat, muscle aches, fatigue, cough, headache, and runny or stuffy nose. Some people may have vomiting and diarrhea, though this is more common in children than adults. In an average year, thousands of people in the [facility] die from flu, and many more are hospitalized. Flu vaccine prevents millions of illnesses and flu-related visits to the doctor each year. 2. Influenza vaccines     CDC recommends everyone 6 months and older get vaccinated every flu season. Children 6 months through 6years of age may need 2 doses during a single flu season. Everyone else needs only 1 dose each flu season. It takes about 2 weeks for protection to develop after vaccination. There are many flu viruses, and they are always changing. Each year a new flu vaccine is made to protect against the influenza viruses believed to be likely to cause disease in the upcoming flu season.  Even when the vaccine doesnt exactly match these viruses, it may still provide some protection. Influenza vaccine does not cause flu. Influenza vaccine may be given at the same time as other vaccines. 3. Talk with your health care provider    Tell your vaccination provider if the person getting the vaccine:   Has had an allergic reaction after a previous dose of influenza vaccine, or has any severe, life-threatening allergies    Has ever had Guillain-Barré Syndrome (also called GBS)    In some cases, your health care provider may decide to postpone influenza vaccination until a future visit. Influenza vaccine can be administered at any time during pregnancy. People who are or will be pregnant during influenza season should receive inactivated influenza vaccine. People with minor illnesses, such as a cold, may be vaccinated. People who are moderately or severely ill should usually wait until they recover before getting influenza vaccine. Your health care provider can give you more information. 4. Risks of a vaccine reaction     Soreness, redness, and swelling where the shot is given, fever, muscle aches, and headache can happen after influenza vaccination.  There may be a very small increased risk of Guillain-Barré Syndrome (GBS) after inactivated influenza vaccine (the flu shot). Domenic John children who get the flu shot along with pneumococcal vaccine (PCV13) and/or DTaP vaccine at the same time might be slightly more likely to have a seizure caused by fever. Tell your health care provider if a child who is getting flu vaccine has ever had a seizure. People sometimes faint after medical procedures, including vaccination. Tell your provider if you feel dizzy or have vision changes or ringing in the ears. As with any medicine, there is a very remote chance of a vaccine causing a severe allergic reaction, other serious injury, or death. 5. What if there is a serious problem?     An allergic reaction could occur after the vaccinated person leaves the clinic. If you see signs of a severe allergic reaction (hives, swelling of the face and throat, difficulty breathing, a fast heartbeat, dizziness, or weakness), call 9-1-1 and get the person to the nearest hospital.    For other signs that concern you, call your health care provider. Adverse reactions should be reported to the Vaccine Adverse Event Reporting System (VAERS). Your health care provider will usually file this report, or you can do it yourself. Visit the VAERS website at www.vaers. [facility].gov or call 9-458.278.7051. VAERS is only for reporting reactions, and VAERS staff members do not give medical advice. 6. The National Vaccine Injury Compensation Program    The [facility] Vaccine Injury Compensation Program (VICP) is a federal program that was created to compensate people who may have been injured by certain vaccines. Claims regarding alleged injury or death due to vaccination have a time limit for filing, which may be as short as two years. Visit the VICP website at [facility].gov/vaccinecompensation or call 1-859.890.2470 to learn about the program and about filing a claim. 7. How can I learn more?  Ask your health care provider.  Call your local or state health department.  Visit the website of the Food and Drug Administration (FDA) for vaccine package inserts and additional information at www.fda.gov/vaccines-blood-biologics/vaccines.  Contact the Centers for Disease Control and Prevention (CDC):  - Call 4-390.895.3886 (1-800-CDC-INFO) or  - Visit CDCs influenza website at www.cdc.gov/flu. Vaccine Information Statement   Inactivated Influenza Vaccine   8/6/2021  42 NELLIE [address]riaza [id number]   Department of Health and Human Services  Centers for Disease Control and Prevention    Office Use Only

## 2021-11-16 NOTE — PATIENT INSTRUCTIONS
Staff present during Radiology Nurse encounter:    Nurse: Rowena Islas  Technologist: GRAY Kee  Cardiologist: Dr Moffett  Ordering Physician: Dr Senior  Reason: CP, GALLO, fatigue  Cardiac CTA Worksheet    Chest pain (symptomatic patients):  \plain    Ivabradine (CORLANOR) 15mg, metoprolol tartrate (LOPRESSOR) 150mg and nitroglycerin (NITROSTAT) .4mg    IV: 20 g Diffusics RAC  Contrast: 90 cc Omnipaque  Prospective scan     Vaccine Information Statement    Recombinant Zoster (Shingles) Vaccine: What You Need to Know    Many Vaccine Information Statements are available in Faroese and other languages. See www.immunize.org/vis  Hojas de información sobre vacunas están disponibles en español y en muchos otros idiomas. Visite www.immunize.org/vis    1. Why get vaccinated? Recombinant zoster (shingles) vaccine can prevent shingles. Shingles (also called herpes zoster, or just zoster) is a painful skin rash, usually with blisters. In addition to the rash, shingles can cause fever, headache, chills, or upset stomach. More rarely, shingles can lead to pneumonia, hearing problems, blindness, brain inflammation (encephalitis), or death. The most common complication of shingles is long-term nerve pain called postherpetic neuralgia (PHN). PHN occurs in the areas where the shingles rash was, even after the rash clears up. It can last for months or years after the rash goes away. The pain from PHN can be severe and debilitating. About 10 to 18% of people who get shingles will experience PHN. The risk of PHN increases with age. An older adult with shingles is more likely to develop PHN and have longer lasting and more severe pain than a younger person with shingles. Shingles is caused by the varicella zoster virus, the same virus that causes chickenpox. After you have chickenpox, the virus stays in your body and can cause shingles later in life. Shingles cannot be passed from one person to another, but the virus that causes shingles can spread and cause chickenpox in someone who had never had chickenpox or received chickenpox vaccine. 2. Recombinant shingles vaccine    Recombinant shingles vaccine provides strong protection against shingles. By preventing shingles, recombinant shingles vaccine also protects against PHN. Recombinant shingles vaccine is the preferred vaccine for the prevention of shingles.   However, a different vaccine, live shingles vaccine, may be used in some circumstances. The recombinant shingles vaccine is recommended for adults 50 years and older without serious immune problems. It is given as a two-dose series. This vaccine is also recommended for people who have already gotten another type of shingles vaccine, the live shingles vaccine. There is no live virus in this vaccine. Shingles vaccine may be given at the same time as other vaccines. 3. Talk with your health care provider    Tell your vaccine provider if the person getting the vaccine:   Has had an allergic reaction after a previous dose of recombinant shingles vaccine, or has any severe, life-threatening allergies.  Is pregnant or breastfeeding.  Is currently experiencing an episode of shingles. In some cases, your health care provider may decide to postpone shingles vaccination to a future visit. People with minor illnesses, such as a cold, may be vaccinated. People who are moderately or severely ill should usually wait until they recover before getting recombinant shingles vaccine. Your health care provider can give you more information. 4. Risks of a vaccine reaction     A sore arm with mild or moderate pain is very common after recombinant shingles vaccine, affecting about 80% of vaccinated people. Redness and swelling can also happen at the site of the injection.  Tiredness, muscle pain, headache, shivering, fever, stomach pain, and nausea happen after vaccination in more than half of people who receive recombinant shingles vaccine. In clinical trials, about 1 out of 6 people who got recombinant zoster vaccine experienced side effects that prevented them from doing regular activities. Symptoms usually went away on their own in 2 to 3 days. You should still get the second dose of recombinant zoster vaccine even if you had one of these reactions after the first dose.       People sometimes faint after medical procedures, including vaccination. Tell your provider if you feel dizzy or have vision changes or ringing in the ears. As with any medicine, there is a very remote chance of a vaccine causing a severe allergic reaction, other serious injury, or death. 5. What if there is a serious problem? An allergic reaction could occur after the vaccinated person leaves the clinic. If you see signs of a severe allergic reaction (hives, swelling of the face and throat, difficulty breathing, a fast heartbeat, dizziness, or weakness), call 9-1-1 and get the person to the nearest hospital.    For other signs that concern you, call your health care provider. Adverse reactions should be reported to the Vaccine Adverse Event Reporting System (VAERS). Your health care provider will usually file this report, or you can do it yourself. Visit the VAERS website at www.vaers. [facility].gov or call 9-490.352.4133. VAERS is only for reporting reactions, and VAERS staff do not give medical advice. 6. How can I learn more?  Ask your health care provider.  Call your local or state health department.    Contact the Centers for Disease Control and Prevention (CDC):  - Call 9-411.829.6312 (1-800-CDC-INFO) or  - Visit CDCs website at www.cdc.gov/vaccines    Vaccine Information Statement   Recombinant Zoster Vaccine   10/30/2019    [facility] Disease Control and Prevention    Office Use Only

## 2022-03-20 PROBLEM — I10 ESSENTIAL HYPERTENSION: Status: ACTIVE | Noted: 2017-10-04

## 2022-08-05 ENCOUNTER — PATIENT MESSAGE (OUTPATIENT)
Dept: FAMILY MEDICINE CLINIC | Age: 72
End: 2022-08-05

## 2022-08-09 RX ORDER — SIMVASTATIN 10 MG/1
10 TABLET, FILM COATED ORAL
Qty: 90 TABLET | Refills: 3 | Status: SHIPPED | OUTPATIENT
Start: 2022-08-09

## 2022-08-09 RX ORDER — LOSARTAN POTASSIUM 50 MG/1
50 TABLET ORAL EVERY EVENING
Qty: 90 TABLET | Refills: 4 | Status: SHIPPED | OUTPATIENT
Start: 2022-08-09 | End: 2022-08-17 | Stop reason: SDUPTHER

## 2022-08-17 RX ORDER — LOSARTAN POTASSIUM 50 MG/1
50 TABLET ORAL EVERY EVENING
Qty: 90 TABLET | Refills: 4 | Status: SHIPPED | OUTPATIENT
Start: 2022-08-17 | End: 2022-08-22

## 2022-12-03 NOTE — PROGRESS NOTES
Mr. Felipe Coker is a 71 y.o. male who is here for evaluation of   Chief Complaint   Patient presents with    Annual Wellness Visit    Hypertension     Does not check BP at home any longer. Immunization/Injection     Vaccines updated via [address]   . ASSESSMENT AND PLAN:    1. Medicare annual wellness visit, subsequent  2. Essential hypertension  Control is suboptimal.  Discussed the need to prevent stroke, A. fib and dementia with better control and he agrees so we will stop the losartan and begin chlorthalidone and olmesartan. Discussed with him tactics that he may use only with the morning chlorthalidone. He will follow-up with us in about 6 weeks. - CBC WITH AUTOMATED DIFF; Future  - METABOLIC PANEL, COMPREHENSIVE; Future  - TSH 3RD GENERATION; Future  - AMB POC URINALYSIS DIP STICK MANUAL W/ MICRO; Future  - chlorthalidone (HYGROTON) 25 mg tablet; Take 0.5 Tablets by mouth daily (with breakfast). Dispense: 90 Tablet; Refill: 4  - olmesartan (BENICAR) 20 mg tablet; Take 1 Tablet by mouth nightly. Indications: high blood pressure  Dispense: 90 Tablet; Refill: 4    3. Nocturia  - PSA, DIAGNOSTIC (PROSTATE SPECIFIC AG); Future    4. Mixed hyperlipidemia  - LIPID PANEL; Future      Orders Placed This Encounter    CBC WITH AUTOMATED DIFF     Standing Status:   Future     Standing Expiration Date:   0/6/1717    METABOLIC PANEL, COMPREHENSIVE     Standing Status:   Future     Standing Expiration Date:   1/5/2023    LIPID PANEL     Standing Status:   Future     Standing Expiration Date:   1/5/2023    PROSTATE SPECIFIC AG     Standing Status:   Future     Standing Expiration Date:   1/5/2023    TSH 3RD GENERATION     Standing Status:   Future     Standing Expiration Date:   1/5/2023    AMB POC URINALYSIS DIP STICK MANUAL W/ MICRO     Standing Status:   Future     Standing Expiration Date:   1/5/2023    multivitamin (ONE A DAY) tablet     Sig: Take  by mouth daily.     omega 3-dha-epa-fish oil (Fish OiL) 100-160-1,000 mg cap     Sig: Take  by mouth. chlorthalidone (HYGROTON) 25 mg tablet     Sig: Take 0.5 Tablets by mouth daily (with breakfast). Dispense:  90 Tablet     Refill:  4    olmesartan (BENICAR) 20 mg tablet     Sig: Take 1 Tablet by mouth nightly. Indications: high blood pressure     Dispense:  90 Tablet     Refill:  4           HPI 45-year-old gentleman who is recently retired arrives today for subsequent annual wellness visit and for discussion about hypertension. He plays in the band, [employer]. ROS:  Denies  fever, chills, cough, chest pain, SOB,  nausea, vomiting, or diarrhea. Denies wt loss, wt gain, hemoptysis, hematochezia or melena. Physical Examination:    Visit Vitals  BP (!) 152/90 (BP 1 Location: Left upper arm, BP Patient Position: Sitting, BP Cuff Size: Adult long)   Pulse 79   Temp 98.1 °F (36.7 °C) (Temporal)   Resp 18   Ht 5' 10\" (1.778 m)   Wt 170 lb 3.2 oz (77.2 kg)   SpO2 98%   BMI 24.42 kg/m²      General:  Alert, cooperative, no distress. Head:  Normocephalic, without obvious abnormality, atraumatic. Eyes:  Conjunctivae/corneas clear. Pupils equal, round, reactive to light. Extraocular movements intact. Lungs:   Clear to auscultation bilaterally. Chest wall:  No tenderness or deformity. Cardiac:  RRR   Abdomen:   Soft, non-tender. Bowel sounds normal. No masses. No organomegaly. Extremities: Extremities normal, atraumatic, no cyanosis or edema. Pulses: 2+ and symmetric all extremities. Skin: Skin color, texture, turgor normal. No rashes or lesions. Lymph nodes: Cervical, supraclavicular, and axillary nodes normal.   Neurologic: CNII-XII intact. Normal strength, sensation, and reflexes throughout. On this date 12/05/2022 I have spent 30 minutes reviewing previous notes, test results and face to face with the patient discussing the diagnosis and importance of compliance with the treatment plan as well as documenting on the day of the visit.     Codie Lyon Ingris MOSCOSO FACP    (signed electronically) on 12/5/2022 at 1:58 PM

## 2022-12-05 ENCOUNTER — OFFICE VISIT (OUTPATIENT)
Dept: FAMILY MEDICINE CLINIC | Age: 72
End: 2022-12-05
Payer: MEDICARE

## 2022-12-05 VITALS
OXYGEN SATURATION: 98 % | WEIGHT: 170.2 LBS | SYSTOLIC BLOOD PRESSURE: 152 MMHG | BODY MASS INDEX: 24.37 KG/M2 | HEIGHT: 70 IN | HEART RATE: 79 BPM | RESPIRATION RATE: 18 BRPM | TEMPERATURE: 98.1 F | DIASTOLIC BLOOD PRESSURE: 90 MMHG

## 2022-12-05 DIAGNOSIS — E78.2 MIXED HYPERLIPIDEMIA: ICD-10-CM

## 2022-12-05 DIAGNOSIS — Z00.00 MEDICARE ANNUAL WELLNESS VISIT, SUBSEQUENT: Primary | ICD-10-CM

## 2022-12-05 DIAGNOSIS — I10 ESSENTIAL HYPERTENSION: ICD-10-CM

## 2022-12-05 DIAGNOSIS — R35.1 NOCTURIA: ICD-10-CM

## 2022-12-05 LAB
BILIRUB UR QL STRIP: NEGATIVE
GLUCOSE UR-MCNC: NEGATIVE MG/DL
KETONES P FAST UR STRIP-MCNC: NEGATIVE MG/DL
PH UR STRIP: 7 [PH] (ref 4.6–8)
PROT UR QL STRIP: NEGATIVE
SP GR UR STRIP: 1.01 (ref 1–1.03)
UA UROBILINOGEN AMB POC: NORMAL (ref 0.2–1)
URINALYSIS CLARITY POC: CLEAR
URINALYSIS COLOR POC: YELLOW
URINE BLOOD POC: NEGATIVE
URINE LEUKOCYTES POC: NEGATIVE
URINE NITRITES POC: NEGATIVE

## 2022-12-05 PROCEDURE — 81000 URINALYSIS NONAUTO W/SCOPE: CPT | Performed by: INTERNAL MEDICINE

## 2022-12-05 PROCEDURE — 99214 OFFICE O/P EST MOD 30 MIN: CPT | Performed by: INTERNAL MEDICINE

## 2022-12-05 PROCEDURE — G8536 NO DOC ELDER MAL SCRN: HCPCS | Performed by: INTERNAL MEDICINE

## 2022-12-05 PROCEDURE — G8753 SYS BP > OR = 140: HCPCS | Performed by: INTERNAL MEDICINE

## 2022-12-05 PROCEDURE — G8427 DOCREV CUR MEDS BY ELIG CLIN: HCPCS | Performed by: INTERNAL MEDICINE

## 2022-12-05 PROCEDURE — 3074F SYST BP LT 130 MM HG: CPT | Performed by: INTERNAL MEDICINE

## 2022-12-05 PROCEDURE — G8755 DIAS BP > OR = 90: HCPCS | Performed by: INTERNAL MEDICINE

## 2022-12-05 PROCEDURE — G8510 SCR DEP NEG, NO PLAN REQD: HCPCS | Performed by: INTERNAL MEDICINE

## 2022-12-05 PROCEDURE — 1123F ACP DISCUSS/DSCN MKR DOCD: CPT | Performed by: INTERNAL MEDICINE

## 2022-12-05 PROCEDURE — 3017F COLORECTAL CA SCREEN DOC REV: CPT | Performed by: INTERNAL MEDICINE

## 2022-12-05 PROCEDURE — G8420 CALC BMI NORM PARAMETERS: HCPCS | Performed by: INTERNAL MEDICINE

## 2022-12-05 PROCEDURE — 3078F DIAST BP <80 MM HG: CPT | Performed by: INTERNAL MEDICINE

## 2022-12-05 PROCEDURE — 1101F PT FALLS ASSESS-DOCD LE1/YR: CPT | Performed by: INTERNAL MEDICINE

## 2022-12-05 PROCEDURE — G0439 PPPS, SUBSEQ VISIT: HCPCS | Performed by: INTERNAL MEDICINE

## 2022-12-05 RX ORDER — CHOLECALCIFEROL (VITAMIN D3) 50 MCG
CAPSULE ORAL
COMMUNITY

## 2022-12-05 RX ORDER — BISMUTH SUBSALICYLATE 262 MG
TABLET,CHEWABLE ORAL DAILY
COMMUNITY

## 2022-12-05 RX ORDER — CHLORTHALIDONE 25 MG/1
12.5 TABLET ORAL
Qty: 90 TABLET | Refills: 4 | Status: SHIPPED | OUTPATIENT
Start: 2022-12-05

## 2022-12-05 RX ORDER — OLMESARTAN MEDOXOMIL 20 MG/1
20 TABLET ORAL
Qty: 90 TABLET | Refills: 4 | Status: SHIPPED | OUTPATIENT
Start: 2022-12-05

## 2022-12-05 NOTE — PROGRESS NOTES
Kelli Cuellar is a 71 y.o. male presenting for/with:    Chief Complaint   Patient presents with    Annual Wellness Visit    Hypertension     Does not check BP at home any longer. Immunization/Injection     Vaccines updated via [facility]    Labs       Visit Vitals  BP (!) 152/90 (BP 1 Location: Left upper arm, BP Patient Position: Sitting, BP Cuff Size: Adult long)   Pulse 79   Temp 98.1 °F (36.7 °C) (Temporal)   Resp 18   Ht 5' 10\" (1.778 m)   Wt 170 lb 3.2 oz (77.2 kg)   SpO2 98%   BMI 24.42 kg/m²     Pain Scale: 0 - No pain/10  Pain Location:     1. \"Have you been to the ER, urgent care clinic since your last visit? Hospitalized since your last visit? \" No    2. \"Have you seen or consulted any other health care providers outside of the [address] [place] [address] since your last visit? \" No     3. For patients aged 39-70: Has the patient had a colonoscopy / FIT/ Cologuard? Yes - no Care Gap present      If the patient is female:    4. For patients aged 41-77: Has the patient had a mammogram within the past 2 years? NA - based on age or sex      11. For patients aged 21-65: Has the patient had a pap smear? NA - based on age or sex      Learning Assessment 12/5/2022   PRIMARY LEARNER Patient   PRIMARY LANGUAGE ENGLISH   LEARNER PREFERENCE PRIMARY DEMONSTRATION   ANSWERED BY self   RELATIONSHIP SELF     Fall Risk Assessment, last 12 mths 12/5/2022   Able to walk? Yes   Fall in past 12 months? 0   Do you feel unsteady? 0   Are you worried about falling 0       3 most recent PHQ Screens 11/14/2022   Little interest or pleasure in doing things Not at all   Feeling down, depressed, irritable, or hopeless Not at all   Total Score PHQ 2 0     Abuse Screening Questionnaire 11/14/2022   Do you ever feel afraid of your partner? N   Are you in a relationship with someone who physically or mentally threatens you? N   Is it safe for you to go home?  Y       ADL Assessment 12/5/2022   Feeding yourself No Help Needed   Getting from bed to chair No Help Needed   Getting dressed No Help Needed   Bathing or showering No Help Needed   Walk across the room (includes cane/walker) No Help Needed   Using the telphone No Help Needed   Taking your medications No Help Needed   Preparing meals No Help Needed   Managing money (expenses/bills) No Help Needed   Moderately strenuous housework (laundry) No Help Needed   Shopping for personal items (toiletries/medicines) No Help Needed   Shopping for groceries No Help Needed   Driving No Help Needed   Climbing a flight of stairs No Help Needed   Getting to places beyond walking distances No Help Needed      Advance Care Planning 6/14/2021   Patient's Healthcare Decision Maker is: Named in scanned ACP document   Confirm Advance Directive Yes, on file      This is the Subsequent Medicare Annual Wellness Exam, performed 12 months or more after the Initial AWV or the last Subsequent AWV    I have reviewed the patient's medical history in detail and updated the computerized patient record. Assessment/Plan   Education and counseling provided:  Are appropriate based on today's review and evaluation  Prostate cancer screening tests (PSA, covered annually)  Cardiovascular screening blood test  Diabetes screening test    1. Medicare annual wellness visit, subsequent  2. Essential hypertension  3. Nocturia  4. Mixed hyperlipidemia       Depression Risk Factor Screening     3 most recent PHQ Screens 11/14/2022   Little interest or pleasure in doing things Not at all   Feeling down, depressed, irritable, or hopeless Not at all   Total Score PHQ 2 0       Alcohol & Drug Abuse Risk Screen    Do you average more than 1 drink per night or more than 7 drinks a week: Yes    In the past three months have you have had more than 4 drinks containing alcohol on one occasion: No          Functional Ability and Level of Safety    Hearing: Hearing is good. Activities of Daily Living:   The home contains: no safety equipment. Patient does total self care      Ambulation: with no difficulty     Fall Risk:  Fall Risk Assessment, last 12 mths 12/5/2022   Able to walk? Yes   Fall in past 12 months? 0   Do you feel unsteady? 0   Are you worried about falling 0      Abuse Screen:  Patient is not abused       Cognitive Screening    Has your family/caregiver stated any concerns about your memory: no     Health Maintenance Due     Health Maintenance Due   Topic Date Due    Lipid Screen  06/14/2022       Patient Care Team   Patient Care Team:  Rajinder Howard MD as PCP - General (Internal Medicine Physician)  Rajinder Howard MD as PCP - REHABILITATION [facility] Empaneled Provider    History     Patient Active Problem List   Diagnosis Code    Mixed hyperlipidemia E78.2    Essential hypertension I10     Past Medical History:   Diagnosis Date    HTN (hypertension)     Mixed hyperlipidemia       Past Surgical History:   Procedure Laterality Date    HX COLONOSCOPY  04/2012    HX HERNIA REPAIR Right     inguinal    HX VASECTOMY       Current Outpatient Medications   Medication Sig Dispense Refill    multivitamin (ONE A DAY) tablet Take  by mouth daily. omega 3-dha-epa-fish oil (Fish OiL) 100-160-1,000 mg cap Take  by mouth.      losartan (COZAAR) 50 mg tablet TAKE 1 TABLET EVERY EVENING (APPOINTMENT REQUIRED FOR MORE FILLS) 90 Tablet 3    simvastatin (ZOCOR) 10 mg tablet Take 1 Tablet by mouth nightly. 90 Tablet 3    B.animalis,bifid,infantis,long 10-15 mg TbEC Take  by mouth. GLUCOSAMINE HCL/CHONDR CALDWELL A NA (GLUCOSAMINE-CHONDROITIN) 750-600 mg tab Take  by mouth. vit B Cmplx 3-FA-Vit C-Biotin (NEPHRO CARRILLO RX) 1- mg-mg-mcg tablet Take 1 Tab by mouth daily. (Patient not taking: Reported on 12/5/2022)      cholecalciferol, vitamin D3, 50 mcg (2,000 unit) tab Take  by mouth.  (Patient not taking: Reported on 12/5/2022)       No Known Allergies    Family History   Problem Relation Age of Onset    Cancer Mother     Diabetes Father Social History     Tobacco Use    Smoking status: Former     Years: 13.00     Types: Cigarettes     Start date: 1968     Quit date: 1981     Years since quittin.3    Smokeless tobacco: Never   Substance Use Topics    Alcohol use:  Yes     Alcohol/week: 14.0 standard drinks     Types: 14 Standard drinks or equivalent per week     Results for orders placed or performed in visit on 22   AMB POC URINALYSIS DIP STICK MANUAL W/ MICRO   Result Value Ref Range    Color (UA POC) Yellow     Clarity (UA POC) Clear     Glucose (UA POC) Negative Negative    Bilirubin (UA POC) Negative Negative    Ketones (UA POC) Negative Negative    Specific gravity (UA POC) 1.010 1.001 - 1.035    Blood (UA POC) Negative Negative    pH (UA POC) 7.0 4.6 - 8.0    Protein (UA POC) Negative Negative    Urobilinogen (UA POC) 0.2 mg/dL 0.2 - 1    Nitrites (UA POC) Negative Negative    Leukocyte esterase (UA POC) Negative Negative         Yaquelin Roblero

## 2022-12-05 NOTE — PATIENT INSTRUCTIONS
Medicare Wellness Visit, Male    The best way to live healthy is to have a lifestyle where you eat a well-balanced diet, exercise regularly, limit alcohol use, and quit all forms of tobacco/nicotine, if applicable. Regular preventive services are another way to keep healthy. Preventive services (vaccines, screening tests, monitoring & exams) can help personalize your care plan, which helps you manage your own care. Screening tests can find health problems at the earliest stages, when they are easiest to treat. [facility] follows the current, evidence-based guidelines published by the [facility] ([facility]) when recommending preventive services for our patients. Because we follow these guidelines, sometimes recommendations change over time as research supports it. (For example, a prostate screening blood test is no longer routinely recommended for men with no symptoms). Of course, you and your doctor may decide to screen more often for some diseases, based on your risk and co-morbidities (chronic disease you are already diagnosed with). Preventive services for you include:  - Medicare offers their members a free annual wellness visit, which is time for you and your primary care provider to discuss and plan for your preventive service needs.  Take advantage of this benefit every year!    -Over the age of 72 should receive the recommended pneumonia vaccines.   -All adults should have a flu vaccine yearly.  -All adults should receive a tetanus vaccine every 10 years.  -Over the age of 48 should receive the shingles vaccines.    -All adults should be screened once for Hepatitis C.  -All adults age 38-68 who are overweight should have a diabetes screening test once every three years.   -Other screening tests & preventive services for persons with diabetes include: an eye exam to screen for diabetic retinopathy, a kidney function test, a foot exam, and stricter control over your cholesterol.   -Cardiovascular screening for adults with routine risk involves an electrocardiogram (ECG) at intervals determined by the provider.     -Colorectal cancer screening should be done for adults age 43-69 with no increased risk factors for colorectal cancer. There are a number of acceptable methods of screening for this type of cancer. Each test has its own benefits and drawbacks. Discuss with your provider what is most appropriate for you during your annual wellness visit. The different tests include: colonoscopy (considered the best screening method), a fecal occult blood test, a fecal DNA test, and sigmoidoscopy.    -Lung cancer screening is recommended annually with a low dose CT scan for adults between age 54 and 68, who have smoked at least 30 pack years (equivalent of 1 pack per day for 30 days), and who is a current smoker or quit less than 15 years ago. -An Abdominal Aortic Aneurysm (AAA) Screening is recommended for men age 73-68 who has ever smoked in their lifetime.      Here is a list of your current Health Maintenance items (your personalized list of preventive services) with a due date:  Health Maintenance Due   Topic Date Due    Cholesterol Test   06/14/2022

## 2022-12-06 DIAGNOSIS — I10 ESSENTIAL HYPERTENSION: Primary | ICD-10-CM

## 2022-12-06 LAB
ALBUMIN SERPL-MCNC: 4.4 G/DL (ref 3.5–5)
ALBUMIN/GLOB SERPL: 1.3 {RATIO} (ref 1.1–2.2)
ALP SERPL-CCNC: 95 U/L (ref 45–117)
ALT SERPL-CCNC: 27 U/L (ref 12–78)
ANION GAP SERPL CALC-SCNC: 5 MMOL/L (ref 5–15)
AST SERPL-CCNC: 26 U/L (ref 15–37)
BASOPHILS # BLD: 0 K/UL (ref 0–0.1)
BASOPHILS NFR BLD: 0 % (ref 0–1)
BILIRUB SERPL-MCNC: 0.5 MG/DL (ref 0.2–1)
BUN SERPL-MCNC: 13 MG/DL (ref 6–20)
BUN/CREAT SERPL: 14 (ref 12–20)
CALCIUM SERPL-MCNC: 9.5 MG/DL (ref 8.5–10.1)
CHLORIDE SERPL-SCNC: 101 MMOL/L (ref 97–108)
CHOLEST SERPL-MCNC: 195 MG/DL
CO2 SERPL-SCNC: 30 MMOL/L (ref 21–32)
CREAT SERPL-MCNC: 0.93 MG/DL (ref 0.7–1.3)
DIFFERENTIAL METHOD BLD: NORMAL
EOSINOPHIL # BLD: 0.2 K/UL (ref 0–0.4)
EOSINOPHIL NFR BLD: 2 % (ref 0–7)
ERYTHROCYTE [DISTWIDTH] IN BLOOD BY AUTOMATED COUNT: 12.5 % (ref 11.5–14.5)
GLOBULIN SER CALC-MCNC: 3.3 G/DL (ref 2–4)
GLUCOSE SERPL-MCNC: 103 MG/DL (ref 65–100)
HCT VFR BLD AUTO: 43.1 % (ref 36.6–50.3)
HDLC SERPL-MCNC: 79 MG/DL
HDLC SERPL: 2.5 {RATIO} (ref 0–5)
HGB BLD-MCNC: 14.7 G/DL (ref 12.1–17)
IMM GRANULOCYTES # BLD AUTO: 0 K/UL (ref 0–0.04)
IMM GRANULOCYTES NFR BLD AUTO: 0 % (ref 0–0.5)
LDLC SERPL CALC-MCNC: 95 MG/DL (ref 0–100)
LYMPHOCYTES # BLD: 2.6 K/UL (ref 0.8–3.5)
LYMPHOCYTES NFR BLD: 29 % (ref 12–49)
MCH RBC QN AUTO: 33 PG (ref 26–34)
MCHC RBC AUTO-ENTMCNC: 34.1 G/DL (ref 30–36.5)
MCV RBC AUTO: 96.6 FL (ref 80–99)
MONOCYTES # BLD: 0.6 K/UL (ref 0–1)
MONOCYTES NFR BLD: 7 % (ref 5–13)
NEUTS SEG # BLD: 5.5 K/UL (ref 1.8–8)
NEUTS SEG NFR BLD: 62 % (ref 32–75)
NRBC # BLD: 0 K/UL (ref 0–0.01)
NRBC BLD-RTO: 0 PER 100 WBC
PLATELET # BLD AUTO: 288 K/UL (ref 150–400)
PMV BLD AUTO: 11.1 FL (ref 8.9–12.9)
POTASSIUM SERPL-SCNC: 4.3 MMOL/L (ref 3.5–5.1)
PROT SERPL-MCNC: 7.7 G/DL (ref 6.4–8.2)
PSA SERPL-MCNC: 1.4 NG/ML (ref 0.01–4)
RBC # BLD AUTO: 4.46 M/UL (ref 4.1–5.7)
SODIUM SERPL-SCNC: 136 MMOL/L (ref 136–145)
TRIGL SERPL-MCNC: 105 MG/DL (ref ?–150)
TSH SERPL DL<=0.05 MIU/L-ACNC: 2.4 UIU/ML (ref 0.36–3.74)
VLDLC SERPL CALC-MCNC: 21 MG/DL
WBC # BLD AUTO: 9 K/UL (ref 4.1–11.1)

## 2022-12-14 ENCOUNTER — TELEPHONE (OUTPATIENT)
Dept: FAMILY MEDICINE CLINIC | Age: 72
End: 2022-12-14

## 2022-12-14 NOTE — TELEPHONE ENCOUNTER
Sent information in [employer] message to patient. MARILOU    ----- Message from Raza Box Kitty Corporal sent at 12/13/2022  5:23 PM EST -----  Regarding: Chlorthalidone  Wow! After just four days on the new meds, I took my BP. Last Friday, on Losartan,  my average after taking BP three times was 139/80. I didn't record pulse, but wish I had. Today, on the new med, my average was 114/71. Today my pulse averaged 86. I'm not sure what it should be, but for years I used to take pride in a resting pulse in the very low 60s. When I work out at [employer], I try to drive my pulse up in the 120 range, but I'm not sure where it started. Is there anything to know about pulse rate? Thanks for these new meds. I think I've figured out how to get the diuretic into me.   Thom Hoffman

## 2023-02-14 NOTE — PROGRESS NOTES
Chief Complaint   Patient presents with    Hypertension    Medication Evaluation       ASSESSMENT AND PLAN:    1. Essential hypertension  Plan at goal.  Check creatinine today  - METABOLIC PANEL, BASIC; Future    2. Elevated glucose  Given the recent hyperglycemia we will check the A1c but I suspect it will be normal  - HEMOGLOBIN A1C WITH EAG; Future      Orders Placed This Encounter    HEMOGLOBIN A1C WITH EAG     Standing Status:   Future     Standing Expiration Date:   0/68/8099    METABOLIC PANEL, BASIC     Standing Status:   Future     Standing Expiration Date:   3/10/2023       Angel Henderson MD, FACP      HPI:        72 yo with recent HTN. Unable to tolerate chlorthalidone. Stable on Olmesartan. Noted to have glucose 139 last year. Due for A1c, renal labs. No Known Allergies    Current Outpatient Medications   Medication Sig    multivitamin (ONE A DAY) tablet Take  by mouth daily. omega 3-dha-epa-fish oil (Fish OiL) 100-160-1,000 mg cap Take  by mouth. olmesartan (BENICAR) 20 mg tablet Take 1 Tablet by mouth nightly. Indications: high blood pressure    simvastatin (ZOCOR) 10 mg tablet Take 1 Tablet by mouth nightly. B.animalis,bifid,infantis,long 10-15 mg TbEC Take  by mouth. GLUCOSAMINE HCL/CHONDR CALDWELL A NA (GLUCOSAMINE-CHONDROITIN) 750-600 mg tab Take  by mouth. No current facility-administered medications for this visit. Past Medical History:   Diagnosis Date    HTN (hypertension)     Mixed hyperlipidemia          ROS:  Denies fever, chills, cough, chest pain, SOB,  nausea, vomiting, or diarrhea. Denies wt loss, wt gain, hemoptysis, hematochezia or melena. Physical Examination:    Visit Vitals  /78   Pulse 77   Temp (!) 96.6 °F (35.9 °C) (Temporal)   Resp 18   Ht 5' 10\" (1.778 m)   Wt 172 lb (78 kg)   SpO2 100%   BMI 24.68 kg/m²      General:  Alert, cooperative, no distress. Head:  Normocephalic, without obvious abnormality, atraumatic.    Eyes: Conjunctivae/corneas clear. Pupils equal, round, reactive to light. Chest: No wheezes, rales. Rubs or ronchi   Cardiac: RRR.  No peripheral edema     Skin: No rash

## 2023-02-17 ENCOUNTER — OFFICE VISIT (OUTPATIENT)
Dept: FAMILY MEDICINE CLINIC | Age: 73
End: 2023-02-17
Payer: MEDICARE

## 2023-02-17 VITALS
WEIGHT: 172 LBS | DIASTOLIC BLOOD PRESSURE: 78 MMHG | BODY MASS INDEX: 24.62 KG/M2 | HEART RATE: 77 BPM | SYSTOLIC BLOOD PRESSURE: 134 MMHG | HEIGHT: 70 IN | RESPIRATION RATE: 18 BRPM | OXYGEN SATURATION: 100 % | TEMPERATURE: 96.6 F

## 2023-02-17 DIAGNOSIS — I10 ESSENTIAL HYPERTENSION: Primary | ICD-10-CM

## 2023-02-17 DIAGNOSIS — I10 ESSENTIAL HYPERTENSION: ICD-10-CM

## 2023-02-17 DIAGNOSIS — R73.09 ELEVATED GLUCOSE: ICD-10-CM

## 2023-02-17 PROCEDURE — G8510 SCR DEP NEG, NO PLAN REQD: HCPCS | Performed by: INTERNAL MEDICINE

## 2023-02-17 PROCEDURE — G8427 DOCREV CUR MEDS BY ELIG CLIN: HCPCS | Performed by: INTERNAL MEDICINE

## 2023-02-17 PROCEDURE — 3017F COLORECTAL CA SCREEN DOC REV: CPT | Performed by: INTERNAL MEDICINE

## 2023-02-17 PROCEDURE — 99213 OFFICE O/P EST LOW 20 MIN: CPT | Performed by: INTERNAL MEDICINE

## 2023-02-17 PROCEDURE — G8420 CALC BMI NORM PARAMETERS: HCPCS | Performed by: INTERNAL MEDICINE

## 2023-02-17 PROCEDURE — 3075F SYST BP GE 130 - 139MM HG: CPT | Performed by: INTERNAL MEDICINE

## 2023-02-17 PROCEDURE — 1101F PT FALLS ASSESS-DOCD LE1/YR: CPT | Performed by: INTERNAL MEDICINE

## 2023-02-17 PROCEDURE — 1123F ACP DISCUSS/DSCN MKR DOCD: CPT | Performed by: INTERNAL MEDICINE

## 2023-02-17 PROCEDURE — G8536 NO DOC ELDER MAL SCRN: HCPCS | Performed by: INTERNAL MEDICINE

## 2023-02-17 PROCEDURE — 3078F DIAST BP <80 MM HG: CPT | Performed by: INTERNAL MEDICINE

## 2023-02-17 RX ORDER — CHLORTHALIDONE 25 MG/1
12.5 TABLET ORAL
Qty: 90 TABLET | Refills: 4 | Status: SHIPPED | OUTPATIENT
Start: 2023-02-17

## 2023-02-17 RX ORDER — OLMESARTAN MEDOXOMIL 20 MG/1
20 TABLET ORAL
Qty: 90 TABLET | Refills: 4 | Status: SHIPPED | OUTPATIENT
Start: 2023-02-17

## 2023-02-17 NOTE — PROGRESS NOTES
+Jose Steen is a 71 y.o. male presenting for/with:    Chief Complaint   Patient presents with    Hypertension    Medication Evaluation       There were no vitals taken for this visit. Pain Scale: /10  Pain Location:     1. \"Have you been to the ER, urgent care clinic since your last visit? Hospitalized since your last visit? \" No    2. \"Have you seen or consulted any other health care providers outside of the [address] [place] [address] since your last visit? \" No     3. For patients aged 39-70: Has the patient had a colonoscopy / FIT/ Cologuard? Yes - no Care Gap present      If the patient is female:    4. For patients aged 41-77: Has the patient had a mammogram within the past 2 years? NA      5. For patients aged 21-65: Has the patient had a pap smear? NA - based on age or sex      Symptom review:  Learning Assessment 12/5/2022   PRIMARY LEARNER Patient   PRIMARY LANGUAGE ENGLISH   LEARNER PREFERENCE PRIMARY DEMONSTRATION   ANSWERED BY self   RELATIONSHIP SELF     Fall Risk Assessment, last 12 mths 2/17/2023   Able to walk? Yes   Fall in past 12 months? 0   Do you feel unsteady? 0   Are you worried about falling 0       3 most recent PHQ Screens 2/17/2023   Little interest or pleasure in doing things Not at all   Feeling down, depressed, irritable, or hopeless Not at all   Total Score PHQ 2 0     Abuse Screening Questionnaire 2/17/2023   Do you ever feel afraid of your partner? N   Are you in a relationship with someone who physically or mentally threatens you? N   Is it safe for you to go home?  Y       ADL Assessment 2/17/2023   Feeding yourself No Help Needed   Getting from bed to chair No Help Needed   Getting dressed No Help Needed   Bathing or showering No Help Needed   Walk across the room (includes cane/walker) No Help Needed   Using the telphone No Help Needed   Taking your medications No Help Needed   Preparing meals No Help Needed   Managing money (expenses/bills) No Help Needed   Moderately strenuous housework (laundry) No Help Needed   Shopping for personal items (toiletries/medicines) No Help Needed   Shopping for groceries No Help Needed   Driving No Help Needed   Climbing a flight of stairs No Help Needed   Getting to places beyond walking distances No Help Needed      Advance Care Planning 2/17/2023   Patient's Healthcare Decision Maker is: Named in scanned ACP document   Confirm Advance Directive Yes, on file

## 2023-02-18 LAB
ANION GAP SERPL CALC-SCNC: 7 MMOL/L (ref 5–15)
BUN SERPL-MCNC: 15 MG/DL (ref 6–20)
BUN/CREAT SERPL: 18 (ref 12–20)
CALCIUM SERPL-MCNC: 9.4 MG/DL (ref 8.5–10.1)
CHLORIDE SERPL-SCNC: 104 MMOL/L (ref 97–108)
CO2 SERPL-SCNC: 28 MMOL/L (ref 21–32)
CREAT SERPL-MCNC: 0.85 MG/DL (ref 0.7–1.3)
EST. AVERAGE GLUCOSE BLD GHB EST-MCNC: 100 MG/DL
GLUCOSE SERPL-MCNC: 148 MG/DL (ref 65–100)
HBA1C MFR BLD: 5.1 % (ref 4–5.6)
POTASSIUM SERPL-SCNC: 4.1 MMOL/L (ref 3.5–5.1)
SODIUM SERPL-SCNC: 139 MMOL/L (ref 136–145)

## 2023-03-01 DIAGNOSIS — I10 ESSENTIAL HYPERTENSION: ICD-10-CM

## 2023-03-01 RX ORDER — CHLORTHALIDONE 25 MG/1
12.5 TABLET ORAL
Qty: 30 TABLET | Refills: 4 | Status: SHIPPED | OUTPATIENT
Start: 2023-03-01

## 2023-03-01 RX ORDER — OLMESARTAN MEDOXOMIL 20 MG/1
20 TABLET ORAL
Qty: 30 TABLET | Refills: 4 | Status: SHIPPED | OUTPATIENT
Start: 2023-03-01

## 2023-05-17 RX ORDER — OLMESARTAN MEDOXOMIL 20 MG/1
TABLET ORAL
COMMUNITY
Start: 2023-03-22

## 2023-05-17 RX ORDER — SIMVASTATIN 10 MG
TABLET ORAL
COMMUNITY
Start: 2023-03-22

## 2023-05-17 RX ORDER — SIMVASTATIN 10 MG
TABLET ORAL
Qty: 90 TABLET | Refills: 3 | Status: SHIPPED | OUTPATIENT
Start: 2023-05-17

## 2023-05-17 RX ORDER — CHLORTHALIDONE 25 MG/1
TABLET ORAL
COMMUNITY
Start: 2023-04-21

## 2023-05-18 RX ORDER — CHLORTHALIDONE 25 MG/1
12.5 TABLET ORAL
COMMUNITY
Start: 2023-03-01

## 2023-05-18 RX ORDER — SIMVASTATIN 10 MG
1 TABLET ORAL NIGHTLY
COMMUNITY
Start: 2022-08-09

## 2023-05-18 RX ORDER — MAGNESIUM CARB/ALUMINUM HYDROX 105-160MG
TABLET,CHEWABLE ORAL
COMMUNITY

## 2023-05-18 RX ORDER — OLMESARTAN MEDOXOMIL 20 MG/1
20 TABLET ORAL
COMMUNITY
Start: 2023-03-01

## 2023-08-18 SDOH — ECONOMIC STABILITY: INCOME INSECURITY: HOW HARD IS IT FOR YOU TO PAY FOR THE VERY BASICS LIKE FOOD, HOUSING, MEDICAL CARE, AND HEATING?: NOT HARD AT ALL

## 2023-08-18 SDOH — ECONOMIC STABILITY: HOUSING INSECURITY
IN THE LAST 12 MONTHS, WAS THERE A TIME WHEN YOU DID NOT HAVE A STEADY PLACE TO SLEEP OR SLEPT IN A SHELTER (INCLUDING NOW)?: NO

## 2023-08-18 SDOH — ECONOMIC STABILITY: TRANSPORTATION INSECURITY
IN THE PAST 12 MONTHS, HAS LACK OF TRANSPORTATION KEPT YOU FROM MEETINGS, WORK, OR FROM GETTING THINGS NEEDED FOR DAILY LIVING?: NO

## 2023-08-18 SDOH — ECONOMIC STABILITY: FOOD INSECURITY: WITHIN THE PAST 12 MONTHS, YOU WORRIED THAT YOUR FOOD WOULD RUN OUT BEFORE YOU GOT MONEY TO BUY MORE.: NEVER TRUE

## 2023-08-18 SDOH — ECONOMIC STABILITY: FOOD INSECURITY: WITHIN THE PAST 12 MONTHS, THE FOOD YOU BOUGHT JUST DIDN'T LAST AND YOU DIDN'T HAVE MONEY TO GET MORE.: NEVER TRUE

## 2023-08-21 ENCOUNTER — OFFICE VISIT (OUTPATIENT)
Age: 73
End: 2023-08-21
Payer: MEDICARE

## 2023-08-21 VITALS
WEIGHT: 164.2 LBS | HEIGHT: 70 IN | SYSTOLIC BLOOD PRESSURE: 117 MMHG | RESPIRATION RATE: 18 BRPM | TEMPERATURE: 97.5 F | HEART RATE: 62 BPM | BODY MASS INDEX: 23.51 KG/M2 | DIASTOLIC BLOOD PRESSURE: 69 MMHG | OXYGEN SATURATION: 98 %

## 2023-08-21 DIAGNOSIS — R35.1 NOCTURIA: ICD-10-CM

## 2023-08-21 DIAGNOSIS — E78.2 MIXED HYPERLIPIDEMIA: ICD-10-CM

## 2023-08-21 DIAGNOSIS — I10 ESSENTIAL (PRIMARY) HYPERTENSION: Primary | ICD-10-CM

## 2023-08-21 DIAGNOSIS — R73.09 OTHER ABNORMAL GLUCOSE: ICD-10-CM

## 2023-08-21 PROCEDURE — 1123F ACP DISCUSS/DSCN MKR DOCD: CPT | Performed by: INTERNAL MEDICINE

## 2023-08-21 PROCEDURE — 99214 OFFICE O/P EST MOD 30 MIN: CPT | Performed by: INTERNAL MEDICINE

## 2023-08-21 PROCEDURE — 3078F DIAST BP <80 MM HG: CPT | Performed by: INTERNAL MEDICINE

## 2023-08-21 PROCEDURE — 3074F SYST BP LT 130 MM HG: CPT | Performed by: INTERNAL MEDICINE

## 2023-08-21 RX ORDER — CHLORTHALIDONE 25 MG/1
12.5 TABLET ORAL
Qty: 90 TABLET | Refills: 3 | Status: SHIPPED | OUTPATIENT
Start: 2023-08-21

## 2023-08-21 SDOH — ECONOMIC STABILITY: FOOD INSECURITY: WITHIN THE PAST 12 MONTHS, YOU WORRIED THAT YOUR FOOD WOULD RUN OUT BEFORE YOU GOT MONEY TO BUY MORE.: NEVER TRUE

## 2023-08-21 SDOH — ECONOMIC STABILITY: FOOD INSECURITY: WITHIN THE PAST 12 MONTHS, THE FOOD YOU BOUGHT JUST DIDN'T LAST AND YOU DIDN'T HAVE MONEY TO GET MORE.: NEVER TRUE

## 2023-08-21 SDOH — ECONOMIC STABILITY: INCOME INSECURITY: HOW HARD IS IT FOR YOU TO PAY FOR THE VERY BASICS LIKE FOOD, HOUSING, MEDICAL CARE, AND HEATING?: NOT HARD AT ALL

## 2023-08-21 ASSESSMENT — PATIENT HEALTH QUESTIONNAIRE - PHQ9
SUM OF ALL RESPONSES TO PHQ9 QUESTIONS 1 & 2: 0
SUM OF ALL RESPONSES TO PHQ QUESTIONS 1-9: 0
SUM OF ALL RESPONSES TO PHQ QUESTIONS 1-9: 0
1. LITTLE INTEREST OR PLEASURE IN DOING THINGS: 0
SUM OF ALL RESPONSES TO PHQ QUESTIONS 1-9: 0
2. FEELING DOWN, DEPRESSED OR HOPELESS: 0
SUM OF ALL RESPONSES TO PHQ QUESTIONS 1-9: 0

## 2023-08-21 NOTE — PROGRESS NOTES
Duration-30 minutes primarily counseling and education in addition to reviewing prior notes and laboratory tests as well as relevant imaging results. Assessment and Plan:    1. Essential (primary) hypertension  At goal.  Discussion regarding when and how to use chlorthalidone. Generally on the days that he sweats heavily especially on days where he uses the sauna he should not take the chlorthalidone at all. He should expect to need it more often in the cooler months of the year. - chlorthalidone (HYGROTON) 25 MG tablet; Take 0.5 tablets by mouth daily (with breakfast)  Dispense: 90 tablet; Refill: 3    2. Mixed hyperlipidemia  Currently at goal    3. Other abnormal glucose  The A1c was normal    4. Nocturia  We will check the PSA next year         Mr. Del Martinez is a 68 y.o. male who is here for evaluation of   Chief Complaint   Patient presents with    Hyperlipidemia    Hypertension   . No orders of the defined types were placed in this encounter. No follow-up provider specified. Current Outpatient Medications   Medication Sig Dispense Refill    Omega-3 Fatty Acids (OMEGA 3 PO) Take by mouth      glucosamine-chondroitin 750-600 MG TABS tablet Take by mouth      olmesartan (BENICAR) 20 MG tablet       simvastatin (ZOCOR) 10 MG tablet       chlorthalidone (HYGROTON) 25 MG tablet Take 0.5 tablets by mouth daily (with breakfast) 90 tablet 3     No current facility-administered medications for this visit. HPI this is a 59-year-old gentleman who has a family history of longevity and hypertension. He is here today for review of his blood pressure, lipids and labs. The bulk of the visit time today involved education regarding use of antihypertensive medications. ROS:  Denies  fever, chills, cough, chest pain, SOB,  nausea, vomiting, or diarrhea. Denies wt loss, wt gain, hemoptysis, hematochezia or melena.     Physical Examination:    /69 (Site: Left Upper Arm, Position: Sitting,
No Help Needed   Climbing a flight of stairs No Help Needed   Getting to places beyond walking distances No Help Needed       AMB Abuse Screening 8/21/2023   Do you ever feel afraid of your partner? N   Are you in a relationship with someone who physically or mentally threatens you? N   Is it safe for you to go home?  [facility]     The patient has appointed the following active healthcare agents:    Primary Decision Maker: Ame Villarreal [facility] - [phone]

## 2023-12-15 ENCOUNTER — TELEPHONE (OUTPATIENT)
Dept: PHARMACY | Facility: CLINIC | Age: 73
End: 2023-12-15

## 2023-12-15 ENCOUNTER — PATIENT MESSAGE (OUTPATIENT)
Age: 73
End: 2023-12-15

## 2023-12-15 DIAGNOSIS — E78.2 MIXED HYPERLIPIDEMIA: Primary | ICD-10-CM

## 2023-12-15 RX ORDER — SIMVASTATIN 10 MG
10 TABLET ORAL NIGHTLY
Qty: 90 TABLET | Refills: 3 | Status: SHIPPED | OUTPATIENT
Start: 2023-12-15

## 2023-12-15 NOTE — TELEPHONE ENCOUNTER
Hiral Rosario returned an adherence call concerning SIMVASTATIN TAB 10 MG. Pt states he is taking SIMVASTATIN TAB 10 MG, 1 tablet daily. Denies missing tablets, but is in awe as to how he had so many tablets remaining when his last refill was in June 2023. He did confirm that was his last fill, he keeps all his paperwork. Shocked that his last refill, which would have been due in September, didn't ship. He is doing well on the medication. He would like assistance getting a refill from [facility] and a new prescription from his provider for future fills. Contacted [facility] and they said they had a new prescription sent to them in May, but it was cancelled by the provider in August. Sending a refill request to the provider, since pt states he is still taking. Routing to self to follow.

## 2023-12-15 NOTE — TELEPHONE ENCOUNTER
[facility] CLINICAL PHARMACY: ADHERENCE REVIEW  Identified care gap per [employer]: fills at [facility]: Statin adherence    Patient also appears to be prescribed: ACE/ARB    ASSESSMENT    ACE/ARB ADHERENCE    Insurance Records claims through  23  (Prior Year [address] = not reported; YTD PDC = 100% - PASSED):   OLMESA MEDOX TAB 20 MG last filled on 23 for 90 day supply. Next refill due: 24    Prescribed si tablet/capsule daily    Per Insurer Portal: last filled on 23 for 90 day supply. Per [facility]: last picked up on 23 for 90 day supply. Billed through [place] 0 refills remaining. BP Readings from Last 3 Encounters:   23 117/69   23 134/78   22 (!) 152/90     CrCl cannot be calculated (Patient's most recent lab result is older than the maximum 180 days allowed. ). Lab Results   Component Value Date    CREATININE 0.85 2023     Lab Results   Component Value Date    K 4.1 2023     STATIN ADHERENCE    Insurance Records claims through  23  (Prior Year [address] = not reported; YTD [address] = 81%; Potential Fail Date: 23):   SIMVASTATIN TAB 10 MG last filled on 06.15.23 for 90 day supply. Next refill due: 23 -PAST DUE 61 DAYS     Prescribed si tablet/capsule daily    Per Insurer Portal: last filled on 06.15.23 for 90 day supply. Per [facility]: last picked up on 06.15.23 for 90 day supply. Billed through [place]     Lab Results   Component Value Date    CHOL 195 2022    TRIG 105 2022    HDL 79 2022    LDLCALC 95 2022     ALT   Date Value Ref Range Status   2022 27 12 - 78 U/L Final     AST   Date Value Ref Range Status   2022 26 15 - 37 U/L Final     The 10-year ASCVD risk score (Jean MCGRATH, et al., 2019) is: 17.1%    Values used to calculate the score:      Age: 68 years      Sex: Male      Is Non- : No      Diabetic: No      Tobacco smoker: No      Systolic Blood Pressure:

## 2023-12-15 NOTE — TELEPHONE ENCOUNTER
Followed up on refill request for SIMVASTATIN TAB 10 MG. Omayra Lara MD sent a new prescription for SIMVASTATIN TAB 10 MG #90/3 [facility]ills to [facility].

## 2024-02-13 NOTE — PROGRESS NOTES
Duration-30 minutes primarily counseling and education in addition to reviewing prior notes and laboratory tests as well as relevant imaging results.    Assessment and Plan:    1. Medicare annual wellness visit, subsequent  2. Essential (primary) hypertension  He is at goal  - Hemoglobin A1C; Future  - CBC with Auto Differential; Future  - Comprehensive Metabolic Panel; Future  - Lipid Panel; Future  - Lipid Panel  - Comprehensive Metabolic Panel  - CBC with Auto Differential  - Hemoglobin A1C    3. Screening for prostate cancer  - PSA Screening; Future  - PSA Screening    4. Elevated glucose  - Hemoglobin A1C; Future  - Hemoglobin A1C         Mr. Brock is a 73 y.o. male who is here for evaluation of   Chief Complaint   Patient presents with    Medicare AWV   .             Orders Placed This Encounter   Procedures    Hemoglobin A1C     Standing Status:   Future     Number of Occurrences:   1     Standing Expiration Date:   2/15/2025    CBC with Auto Differential     Standing Status:   Future     Number of Occurrences:   1     Standing Expiration Date:   2/15/2025    Comprehensive Metabolic Panel     Standing Status:   Future     Number of Occurrences:   1     Standing Expiration Date:   2/15/2025    PSA Screening     Standing Status:   Future     Number of Occurrences:   1     Standing Expiration Date:   2/15/2025    Lipid Panel     Standing Status:   Future     Number of Occurrences:   1     Standing Expiration Date:   2/15/2025       No follow-up provider specified.    Current Outpatient Medications   Medication Sig Dispense Refill    simvastatin (ZOCOR) 10 MG tablet Take 1 tablet by mouth nightly 90 tablet 3    Omega-3 Fatty Acids (OMEGA 3 PO) Take by mouth      chlorthalidone (HYGROTON) 25 MG tablet Take 0.5 tablets by mouth daily (with breakfast) 90 tablet 3    glucosamine-chondroitin 750-600 MG TABS tablet Take by mouth      olmesartan (BENICAR) 20 MG tablet        No current facility-administered

## 2024-02-15 ENCOUNTER — OFFICE VISIT (OUTPATIENT)
Age: 74
End: 2024-02-15

## 2024-02-15 VITALS
DIASTOLIC BLOOD PRESSURE: 73 MMHG | WEIGHT: 169 LBS | SYSTOLIC BLOOD PRESSURE: 123 MMHG | BODY MASS INDEX: 24.2 KG/M2 | HEART RATE: 70 BPM | RESPIRATION RATE: 18 BRPM | OXYGEN SATURATION: 100 % | HEIGHT: 70 IN | TEMPERATURE: 97 F

## 2024-02-15 DIAGNOSIS — I10 ESSENTIAL (PRIMARY) HYPERTENSION: ICD-10-CM

## 2024-02-15 DIAGNOSIS — Z00.00 MEDICARE ANNUAL WELLNESS VISIT, SUBSEQUENT: Primary | ICD-10-CM

## 2024-02-15 DIAGNOSIS — R73.09 ELEVATED GLUCOSE: ICD-10-CM

## 2024-02-15 DIAGNOSIS — Z12.5 SCREENING FOR PROSTATE CANCER: ICD-10-CM

## 2024-02-15 NOTE — PATIENT INSTRUCTIONS
[place] on Aging online.  You need 8834-3767 mg of calcium and 5791-8362 IU of vitamin D per day. It is possible to meet your calcium requirement with diet alone, but a vitamin D supplement is usually necessary to meet this goal.  When exposed to the sun, use a sunscreen that protects against both UVA and UVB radiation with an SPF of 30 or greater. Reapply every 2 to 3 hours or after sweating, drying off with a towel, or swimming.  Always wear a seat belt when traveling in a car. Always wear a helmet when riding a bicycle or motorcycle.

## 2024-02-15 NOTE — PROGRESS NOTES
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 male presenting for/with:    Chief Complaint   Patient presents with    Medicare AWV       Vitals:    02/15/24 0836   BP: 123/73   Site: Left Upper Arm   Position: Sitting   Cuff Size: Medium Adult   Pulse: 70   Resp: 18   Temp: 97 °F (36.1 °C)   TempSrc: Temporal   SpO2: 100%   Weight: 76.7 kg (169 lb)   Height: 1.778 m (5' 10\")       Pain Scale: 0 - No pain/10  Pain Location:     \"Have you been to the ER, urgent care clinic since your last visit?  Hospitalized since your last visit?\"    NO    “Have you seen or consulted any other health care providers outside of [facility] since your last visit?”    NO                 2/15/2024     8:29 AM   PHQ-9    Little interest or pleasure in doing things 0   Feeling down, depressed, or hopeless 0   PHQ-2 Score 0   PHQ-9 Total Score 0           2/15/2024     8:30 AM 8/21/2023     9:10 AM 12/5/2022    12:00 AM   [facility] AMB LEARNING ASSESSMENT   Primary Learner Patient Patient Patient   Primary Language ENGLISH ENGLISH ENGLISH   Learning Preference DEMONSTRATION DEMONSTRATION DEMONSTRATION   Answered By patient patient self   Relationship to Learner SELF SELF SELF            2/15/2024     8:29 AM   Amb Fall Risk Assessment and TUG Test   Do you feel unsteady or are you worried about falling?  no   2 or more falls in past year? no   Fall with injury in past year? no           2/15/2024     8:00 AM 8/21/2023     9:00 AM   ADL ASSESSMENT   Feeding yourself No Help Needed No Help Needed   Getting from bed to chair No Help Needed No Help Needed   Getting dressed No Help Needed No Help Needed   Bathing or showering No Help Needed No Help Needed   Walk across the room (includes cane/walker) No Help Needed No Help Needed   Using the telphone No Help Needed No Help Needed   Taking your medications No Help Needed No Help Needed   Preparing meals No Help Needed No Help Needed   Managing money (expenses/bills) No Help Needed No Help Needed

## 2024-02-16 LAB
ALBUMIN SERPL-MCNC: 4.5 G/DL (ref 3.5–5)
ALBUMIN/GLOB SERPL: 1.5 (ref 1.1–2.2)
ALP SERPL-CCNC: 74 U/L (ref 45–117)
ALT SERPL-CCNC: 18 U/L (ref 12–78)
ANION GAP SERPL CALC-SCNC: 9 MMOL/L (ref 5–15)
AST SERPL-CCNC: 25 U/L (ref 15–37)
BASOPHILS # BLD: 0 K/UL (ref 0–0.1)
BASOPHILS NFR BLD: 0 % (ref 0–1)
BILIRUB SERPL-MCNC: 0.5 MG/DL (ref 0.2–1)
BUN SERPL-MCNC: 33 MG/DL (ref 6–20)
BUN/CREAT SERPL: 20 (ref 12–20)
CALCIUM SERPL-MCNC: 9.9 MG/DL (ref 8.5–10.1)
CHLORIDE SERPL-SCNC: 95 MMOL/L (ref 97–108)
CHOLEST SERPL-MCNC: 173 MG/DL
CO2 SERPL-SCNC: 25 MMOL/L (ref 21–32)
CREAT SERPL-MCNC: 1.63 MG/DL (ref 0.7–1.3)
DIFFERENTIAL METHOD BLD: NORMAL
EOSINOPHIL # BLD: 0.1 K/UL (ref 0–0.4)
EOSINOPHIL NFR BLD: 2 % (ref 0–7)
ERYTHROCYTE [DISTWIDTH] IN BLOOD BY AUTOMATED COUNT: 13 % (ref 11.5–14.5)
EST. AVERAGE GLUCOSE BLD GHB EST-MCNC: 105 MG/DL
GLOBULIN SER CALC-MCNC: 3.1 G/DL (ref 2–4)
GLUCOSE SERPL-MCNC: 110 MG/DL (ref 65–100)
HBA1C MFR BLD: 5.3 % (ref 4–5.6)
HCT VFR BLD AUTO: 38.9 % (ref 36.6–50.3)
HDLC SERPL-MCNC: 61 MG/DL
HDLC SERPL: 2.8 (ref 0–5)
HGB BLD-MCNC: 13.2 G/DL (ref 12.1–17)
IMM GRANULOCYTES # BLD AUTO: 0 K/UL (ref 0–0.04)
IMM GRANULOCYTES NFR BLD AUTO: 0 % (ref 0–0.5)
LDLC SERPL CALC-MCNC: 97.2 MG/DL (ref 0–100)
LYMPHOCYTES NFR BLD: 25 % (ref 12–49)
MCH RBC QN AUTO: 31.8 PG (ref 26–34)
MCHC RBC AUTO-ENTMCNC: 33.9 G/DL (ref 30–36.5)
MCV RBC AUTO: 93.7 FL (ref 80–99)
MONOCYTES # BLD: 0.6 K/UL (ref 0–1)
MONOCYTES NFR BLD: 8 % (ref 5–13)
NEUTS SEG # BLD: 5.3 K/UL (ref 1.8–8)
NEUTS SEG NFR BLD: 65 % (ref 32–75)
NRBC # BLD: 0 K/UL (ref 0–0.01)
NRBC BLD-RTO: 0 PER 100 WBC
PLATELET # BLD AUTO: 259 K/UL (ref 150–400)
PMV BLD AUTO: 10.5 FL (ref 8.9–12.9)
POTASSIUM SERPL-SCNC: 4.6 MMOL/L (ref 3.5–5.1)
PROT SERPL-MCNC: 7.6 G/DL (ref 6.4–8.2)
PSA SERPL-MCNC: 1.4 NG/ML (ref 0.01–4)
RBC # BLD AUTO: 4.15 M/UL (ref 4.1–5.7)
SODIUM SERPL-SCNC: 129 MMOL/L (ref 136–145)
TRIGL SERPL-MCNC: 74 MG/DL
VLDLC SERPL CALC-MCNC: 14.8 MG/DL
WBC # BLD AUTO: 8.2 K/UL (ref 4.1–11.1)

## 2024-02-17 NOTE — RESULT ENCOUNTER NOTE
Labs reviewed with Vanesa Vinod on February 17.  Basically everything looked good with the exception of the low sodium, low chloride and high creatinine.  This is almost certainly due to the chlorthalidone.  We will discontinue the chlorthalidone for now and check labs again later this summer.  He was advised to set up an appointment at that time

## 2024-04-10 RX ORDER — OLMESARTAN MEDOXOMIL 20 MG/1
TABLET ORAL
Qty: 90 TABLET | Refills: 3 | Status: SHIPPED | OUTPATIENT
Start: 2024-04-10

## 2024-04-29 ENCOUNTER — OFFICE VISIT (OUTPATIENT)
Age: 74
End: 2024-04-29
Payer: MEDICARE

## 2024-04-29 VITALS
HEIGHT: 70 IN | OXYGEN SATURATION: 97 % | WEIGHT: 172 LBS | BODY MASS INDEX: 24.62 KG/M2 | RESPIRATION RATE: 18 BRPM | HEART RATE: 86 BPM | SYSTOLIC BLOOD PRESSURE: 126 MMHG | TEMPERATURE: 97.8 F | DIASTOLIC BLOOD PRESSURE: 66 MMHG

## 2024-04-29 DIAGNOSIS — M54.32 SCIATICA OF LEFT SIDE: Primary | ICD-10-CM

## 2024-04-29 PROCEDURE — 3074F SYST BP LT 130 MM HG: CPT | Performed by: INTERNAL MEDICINE

## 2024-04-29 PROCEDURE — 1123F ACP DISCUSS/DSCN MKR DOCD: CPT | Performed by: INTERNAL MEDICINE

## 2024-04-29 PROCEDURE — 3078F DIAST BP <80 MM HG: CPT | Performed by: INTERNAL MEDICINE

## 2024-04-29 PROCEDURE — 99213 OFFICE O/P EST LOW 20 MIN: CPT | Performed by: INTERNAL MEDICINE

## 2024-04-29 RX ORDER — CYCLOBENZAPRINE HCL 10 MG
10 TABLET ORAL NIGHTLY PRN
Qty: 30 TABLET | Refills: 0 | Status: SHIPPED | OUTPATIENT
Start: 2024-04-29 | End: 2024-04-29 | Stop reason: SDUPTHER

## 2024-04-29 RX ORDER — CYCLOBENZAPRINE HCL 10 MG
10 TABLET ORAL NIGHTLY PRN
Qty: 30 TABLET | Refills: 0 | Status: SHIPPED | OUTPATIENT
Start: 2024-04-29 | End: 2024-05-09

## 2024-04-29 RX ORDER — METHYLPREDNISOLONE 4 MG/1
TABLET ORAL
Qty: 1 KIT | Refills: 0 | Status: SHIPPED | OUTPATIENT
Start: 2024-04-29

## 2024-04-29 RX ORDER — METHYLPREDNISOLONE 4 MG/1
TABLET ORAL
Qty: 1 KIT | Refills: 0 | Status: SHIPPED | OUTPATIENT
Start: 2024-04-29 | End: 2024-04-29 | Stop reason: SDUPTHER

## 2024-04-29 ASSESSMENT — PATIENT HEALTH QUESTIONNAIRE - PHQ9
1. LITTLE INTEREST OR PLEASURE IN DOING THINGS: NOT AT ALL
SUM OF ALL RESPONSES TO PHQ9 QUESTIONS 1 & 2: 0
SUM OF ALL RESPONSES TO PHQ QUESTIONS 1-9: 0
2. FEELING DOWN, DEPRESSED OR HOPELESS: NOT AT ALL
SUM OF ALL RESPONSES TO PHQ QUESTIONS 1-9: 0

## 2024-04-29 NOTE — PROGRESS NOTES
1. Sciatica of left side  Pain is typical for sciatica.  Advised to avoid prolonged sitting and driving.  Needs physical therapy referral.  Flexeril at bedtime.  Steroids for immediate relief.  Red flag symptoms discussed and patient voiced understanding.  Follow-up in 3 weeks.  If not better at that time obtain plain films, possibly MRI and neurosurgery referral.  - Ambulatory referral to Physical Therapy  - cyclobenzaprine (FLEXERIL) 10 MG tablet; Take 1 tablet by mouth nightly as needed for Muscle spasms  Dispense: 30 tablet; Refill: 0  - methylPREDNISolone (MEDROL DOSEPACK) 4 MG tablet; Take by mouth.  Dispense: 1 kit; Refill: 0         Chief Complaint   Patient presents with    Back Pain     Pt reports having left side sciatica pain x 5 days. Pt denies injury/fall.         Orders Placed This Encounter   Procedures    Ambulatory referral to Physical Therapy     Referral Priority:   Routine     Referral Type:   Physical Therapy     Referral Reason:   Specialty Services Required     Requested Specialty:   Physical Therapist     Number of Visits Requested:   1       Elías Buchanan MD, FACP      HPI:         is a 73 y.o. male who arrives for left sided sciatic pain which began after 3-1/2-hour car ride.    Symptoms present for 5 days.    No bowel or bladder incontinence or lower extremity weakness.    He has never had sciatica before.  Denies rash.        No Known Allergies    Outpatient Encounter Medications as of 4/29/2024   Medication Sig Dispense Refill    Loratadine (CLARITIN PO) Take by mouth      cyclobenzaprine (FLEXERIL) 10 MG tablet Take 1 tablet by mouth nightly as needed for Muscle spasms 30 tablet 0    methylPREDNISolone (MEDROL DOSEPACK) 4 MG tablet Take by mouth. 1 kit 0    olmesartan (BENICAR) 20 MG tablet TAKE 1 TABLET BY MOUTH AT NIGHT  FOR HIGH BLOOD PRESSURE 90 tablet 3    simvastatin (ZOCOR) 10 MG tablet Take 1 tablet by mouth nightly 90 tablet 3    Omega-3 Fatty Acids (OMEGA 3 PO)

## 2024-04-29 NOTE — PROGRESS NOTES
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 male presenting for/with:    Chief Complaint   Patient presents with    Back Pain     Pt reports having left side sciatica pain x 5 days. Pt denies injury/fall.       Vitals:    04/29/24 0821   BP: 126/66   Site: Left Upper Arm   Position: Sitting   Cuff Size: Medium Adult   Pulse: 86   Resp: 18   Temp: 97.8 °F (36.6 °C)   TempSrc: Temporal   SpO2: 97%   Weight: 78 kg (172 lb)   Height: 1.778 m (5' 10\")       Pain Scale: 8/10  Pain Location: Back (left side)    \"Have you been to the ER, urgent care clinic since your last visit?  Hospitalized since your last visit?\"    NO    “Have you seen or consulted any other health care providers outside of [facility] since your last visit?”    NO                 4/29/2024     8:20 AM   PHQ-9    Little interest or pleasure in doing things 0   Feeling down, depressed, or hopeless 0   PHQ-2 Score 0   PHQ-9 Total Score 0           2/15/2024     8:30 AM 8/21/2023     9:10 AM 12/5/2022    12:00 AM   [facility] AMB LEARNING ASSESSMENT   Primary Learner Patient Patient Patient   Primary Language ENGLISH ENGLISH ENGLISH   Learning Preference DEMONSTRATION DEMONSTRATION DEMONSTRATION   Answered By patient patient self   Relationship to Learner SELF SELF SELF            4/29/2024     8:20 AM   Amb Fall Risk Assessment and TUG Test   Do you feel unsteady or are you worried about falling?  no   2 or more falls in past year? no   Fall with injury in past year? no           4/29/2024     8:00 AM 2/15/2024     8:00 AM 8/21/2023     9:00 AM   ADL ASSESSMENT   Feeding yourself No Help Needed No Help Needed No Help Needed   Getting from bed to chair No Help Needed No Help Needed No Help Needed   Getting dressed No Help Needed No Help Needed No Help Needed   Bathing or showering No Help Needed No Help Needed No Help Needed   Walk across the room (includes cane/walker) No Help Needed No Help Needed No Help Needed   Using the telphone No Help Needed No Help

## 2024-05-21 ENCOUNTER — HOSPITAL ENCOUNTER (OUTPATIENT)
Facility: HOSPITAL | Age: 74
Setting detail: RECURRING SERIES
Discharge: HOME | End: 2024-05-24
Payer: MEDICARE

## 2024-05-21 PROCEDURE — 97110 THERAPEUTIC EXERCISES: CPT

## 2024-05-21 PROCEDURE — 97161 PT EVAL LOW COMPLEX 20 MIN: CPT

## 2024-05-21 NOTE — PROGRESS NOTES
[facility]  [address] [place] [address]  Office [phone]  Fax [phone]       PHYSICAL THERAPY - MEDICARE EVALUATION/PLAN OF CARE NOTE (updated 3/23)      Date: 2024          Patient Name:  Ministerio Brock :  1950   Medical   Diagnosis:  Sciatica of left side [M54.32] Treatment Diagnosis:  M25.552  LEFT HIP PAIN , M54.32  SCIATICA, LEFT SIDE and M54.59  OTHER LOWER BACK PAIN , and M62.81  GENERAL MUSCLE WEAKNESS    Referral Source:  Elías Buchanan MD Insurance:  Payor: [facility] MEDICARE / Plan: [facility] MEDICARE ADVANTAGE / Product Type: *No Product type* /             Patient  verified yes     Visit #   Current  / Total 1 16     SUBJECTIVE  Pain Level (0-10 scale): 0-5/10, was 8/10 initially  []constant []intermittent []improving []worsening []no change since onset    Any medication changes, allergies to medications, adverse drug reactions, diagnosis change, or new procedure performed?: [x] No    [] Yes (see summary sheet for update)  Medications: Verified on Patient Summary List    Subjective functional status/changes:      sudden onset of L post hip and thigh, was difficult to walk and sleep, get comfortable at night  Sleeping with hips and knees in 90 degree angle helps  Active- usually walks 3-5 miles/day, and also attends [employer] [facility]  Start of Care: 2024  Comorbidities:HTN  Pt Goals: relieve pain, return to all activities without pain or difficulty      OBJECTIVE    Posture:  ***  Other Observations:  ***  Gait and Functional Mobility:  ***  Palpation: ***  Lumbar AROM 75% throughout , slight lat hip pain L SB      Lumbar AROM:  Cervical AROM:        R  L  R  L  Flexion    ***  ***  ***  ***  Extension   ***  ***  ***  ***  Side Bending   ***  ***  ***  ***  Rotation   ***  ***  ***  ***      LOWER QUARTER   MUSCLE STRENGTH  KEY       R  L  0 - No Contraction  L1, L2 Psoas  ***  ***  1 - Trace   L3 Quads  ***  ***  2

## 2024-06-17 ENCOUNTER — HOSPITAL ENCOUNTER (OUTPATIENT)
Facility: HOSPITAL | Age: 74
Setting detail: RECURRING SERIES
Discharge: HOME | End: 2024-06-20
Payer: MEDICARE

## 2024-06-17 PROCEDURE — 97110 THERAPEUTIC EXERCISES: CPT

## 2024-06-17 NOTE — PROGRESS NOTES
PHYSICAL THERAPY - MEDICARE DAILY TREATMENT NOTE (updated 3/23)      Date: 2024          Patient Name:  Ministerio Brock :  1950   Medical   Diagnosis:  Sciatica of left side [M54.32] Treatment Diagnosis:  M25.552  LEFT HIP PAIN  and M54.32  SCIATICA, LEFT SIDE    Referral Source:  Elías Buchanan MD Insurance:   Payor: [facility] MEDICARE / Plan: [facility] MEDICARE ADVANTAGE / Product Type: *No Product type* /                     Patient  verified yes     Visit #   Current  / Total 2 16   Time   In / Out 930 955   Total Treatment Time 25   Total Timed Codes 25   1:1 Treatment Time 25      [facility] Totals Reminder:  bill using total billable   min of TIMED therapeutic procedures and modalities.   8-22 min = 1 unit; 23-37 min = 2 units; 38-52 min = 3 units; 53-67 min = 4 units; 68-82 min = 5 units        SUBJECTIVE    Pain Level (0-10 scale): 0 \"feels tight\"    Any medication changes, allergies to medications, adverse drug reactions, diagnosis change, or new procedure performed?: [x] No    [] Yes (see summary sheet for update)  Medications: Verified on Patient Summary List    Subjective functional status/changes:     Able to walk 1/2 mile  Numbness on front of calf almost gone  I am sorry that I am late, I thought the appointment time was 9;30    OBJECTIVE      Therapeutic Procedures:  Tx Min Billable or 1:1 Min (if diff from Tx Min) Procedure, Rationale, Specifics   25 25   08793 Therapeutic Exercise (timed):  increase ROM, strength, coordination, balance, and proprioception to improve patient's ability to progress to PLOF and address remaining functional goals. (see flow sheet as applicable)     Details if applicable:    Bridging 5\" 2x10  SLR 2x10  Sidelying hip abd 2x10  Sitting HS stretch 3x, 15\"  Sitting Piriformis stretch 3x, 15\"    Discussed what he will do at [facility]  1 mile up to .5 % incline  Weight routine UE  Reverse sit ups on incline board  Sauna  Above HEP review                           25 25

## 2024-06-26 ENCOUNTER — HOSPITAL ENCOUNTER (OUTPATIENT)
Facility: HOSPITAL | Age: 74
Setting detail: RECURRING SERIES
Discharge: HOME | End: 2024-06-29
Payer: MEDICARE

## 2024-06-26 PROCEDURE — 97110 THERAPEUTIC EXERCISES: CPT

## 2024-06-26 NOTE — PROGRESS NOTES
PHYSICAL THERAPY - MEDICARE DAILY TREATMENT NOTE (updated 3/23)      Date: 2024             Patient Name:  Ministerio Brock :  1950   Medical   Diagnosis:  Sciatica of left side [M54.32] Treatment Diagnosis:  M25.552  LEFT HIP PAIN  and M54.32  SCIATICA, LEFT SIDE    Referral Source:  Elías Buchanan MD Insurance:   Payor: [facility] MEDICARE / Plan: [facility] MEDICARE ADVANTAGE / Product Type: *No Product type* /                           Patient  verified yes     Visit #   Current  / Total 3 16   Time   In / Out 915 955   Total Treatment Time 40   Total Timed Codes 40   1:1 Treatment Time 40      [facility] Totals Reminder:  bill using total billable   min of TIMED therapeutic procedures and modalities.   8-22 min = 1 unit; 23-37 min = 2 units; 38-52 min = 3 units; 53-67 min = 4 units; 68-82 min = 5 units         SUBJECTIVE     Pain Level (0-10 scale): 0 \"feels tight\"     Any medication changes, allergies to medications, adverse drug reactions, diagnosis change, or new procedure performed?: [x] No    [] Yes (see summary sheet for update)  Medications: Verified on Patient Summary List     Subjective functional status/changes:     Patient has been walking 1 mile on TM, 3.5 elevation, 20 min on weight routine, sauna and stretches, push ups and bench press, lat pulls  Feels tightness in L LE in groin and lateral thigh    Walking outdoors, 3 or 4 miles, and leg felt fine when walking however felt different    Sometimes sit to stand rely on R LE more    Upper trap discomfort     OBJECTIVE        Therapeutic Procedures:  Tx Min Billable or 1:1 Min (if diff from Tx Min) Procedure, Rationale, Specifics   40 40   13992 Therapeutic Exercise (timed):  increase ROM, strength, coordination, balance, and proprioception to improve patient's ability to progress to PLOF and address remaining functional goals. (see flow sheet as applicable)       Discussed [employer] exercises and made modifications  Educated in

## 2024-06-28 DIAGNOSIS — R49.0 HOARSENESS: Primary | ICD-10-CM

## 2024-07-14 NOTE — PROGRESS NOTES
1. Essential (primary) hypertension  He is at goal.  Continue Olmesartan.  Follow up Feb  2. Sciatica of left side  This has largely resolved  3. Encounter for screening for colorectal malignant neoplasm  - Cologuard (Fecal DNA Colorectal Cancer Screening)         Chief Complaint   Patient presents with    Hypertension     Routine F/U.     Colon Cancer Screening    Back Pain     Sciatica has improved.     Hoarse     Reports having an ENT referral R/T \"Gravely voice\". States has appt with Dr Rodas on Monday          Orders Placed This Encounter   Procedures    Cologuard (Fecal DNA Colorectal Cancer Screening)       Elías Buchanan MD, FACP      HPI:         is a 74 y.o. male who arrives for interval assessment of BP and recent sciatic pain.  He is much improved and PT helped.  Also scheduled to see Dr Rodas for vocal cord concerns.    No Known Allergies    Outpatient Encounter Medications as of 7/18/2024   Medication Sig Dispense Refill    olmesartan (BENICAR) 20 MG tablet TAKE 1 TABLET BY MOUTH AT NIGHT  FOR HIGH BLOOD PRESSURE 90 tablet 3    simvastatin (ZOCOR) 10 MG tablet Take 1 tablet by mouth nightly 90 tablet 3    glucosamine-chondroitin 750-600 MG TABS tablet Take by mouth      [DISCONTINUED] Loratadine (CLARITIN PO) Take by mouth      [DISCONTINUED] methylPREDNISolone (MEDROL DOSEPACK) 4 MG tablet Take by mouth. 1 kit 0    [DISCONTINUED] Omega-3 Fatty Acids (OMEGA 3 PO) Take by mouth       No facility-administered encounter medications on file as of 7/18/2024.         Past Medical History:   Diagnosis Date    HTN (hypertension)     Mixed hyperlipidemia          ROS:  Denies fever, chills, cough, chest pain, SOB,  nausea, vomiting, or diarrhea.  Denies wt loss, wt gain, hemoptysis, hematochezia or melena.    Physical Examination:    /71 (Site: Left Upper Arm, Position: Sitting, Cuff Size: Medium Adult)   Pulse 78   Resp 18   Ht 1.778 m (5' 10\")   Wt 76.4 kg (168 lb 6.4 oz)   SpO2 96%

## 2024-07-18 ENCOUNTER — OFFICE VISIT (OUTPATIENT)
Age: 74
End: 2024-07-18
Payer: MEDICARE

## 2024-07-18 VITALS
WEIGHT: 168.4 LBS | RESPIRATION RATE: 18 BRPM | SYSTOLIC BLOOD PRESSURE: 112 MMHG | OXYGEN SATURATION: 96 % | HEIGHT: 70 IN | DIASTOLIC BLOOD PRESSURE: 71 MMHG | HEART RATE: 78 BPM | BODY MASS INDEX: 24.11 KG/M2

## 2024-07-18 DIAGNOSIS — M54.32 SCIATICA OF LEFT SIDE: ICD-10-CM

## 2024-07-18 DIAGNOSIS — I10 ESSENTIAL (PRIMARY) HYPERTENSION: Primary | ICD-10-CM

## 2024-07-18 DIAGNOSIS — Z12.12 ENCOUNTER FOR SCREENING FOR COLORECTAL MALIGNANT NEOPLASM: ICD-10-CM

## 2024-07-18 DIAGNOSIS — Z12.11 ENCOUNTER FOR SCREENING FOR COLORECTAL MALIGNANT NEOPLASM: ICD-10-CM

## 2024-07-18 PROCEDURE — 3074F SYST BP LT 130 MM HG: CPT | Performed by: INTERNAL MEDICINE

## 2024-07-18 PROCEDURE — 1123F ACP DISCUSS/DSCN MKR DOCD: CPT | Performed by: INTERNAL MEDICINE

## 2024-07-18 PROCEDURE — 99214 OFFICE O/P EST MOD 30 MIN: CPT | Performed by: INTERNAL MEDICINE

## 2024-07-18 PROCEDURE — 3078F DIAST BP <80 MM HG: CPT | Performed by: INTERNAL MEDICINE

## 2024-07-18 ASSESSMENT — PATIENT HEALTH QUESTIONNAIRE - PHQ9
1. LITTLE INTEREST OR PLEASURE IN DOING THINGS: NOT AT ALL
SUM OF ALL RESPONSES TO PHQ QUESTIONS 1-9: 0
SUM OF ALL RESPONSES TO PHQ9 QUESTIONS 1 & 2: 0
SUM OF ALL RESPONSES TO PHQ QUESTIONS 1-9: 0
2. FEELING DOWN, DEPRESSED OR HOPELESS: NOT AT ALL

## 2024-07-18 NOTE — PROGRESS NOTES
Ministerio Brock is a 74 y.o. male presenting for/with:    Chief Complaint   Patient presents with    Hypertension     Routine F/U.     Colon Cancer Screening    Back Pain     Sciatica has improved.     Cb     Reports having an ENT referral R/T \"Gravely voice\". States has appt with Dr Rodas on Monday        Vitals:    07/18/24 0929   BP: 112/71   Site: Left Upper Arm   Position: Sitting   Cuff Size: Medium Adult   Pulse: 78   Resp: 18   SpO2: 96%   Weight: 76.4 kg (168 lb 6.4 oz)   Height: 1.778 m (5' 10\")       Pain Scale: 0 - No pain/10  Pain Location:     \"Have you been to the ER, urgent care clinic since your last visit?  Hospitalized since your last visit?\"    NO    “Have you seen or consulted any other health care providers outside of [facility] since your last visit?”    NO    “Have you had a colorectal cancer screening such as a colonoscopy/FIT/Cologuard?    NO    Date of last Colonoscopy: 4/20/2012  Date of last Cologuard: 6/19/2021  No FIT/FOBT on file   No flexible sigmoidoscopy on file                7/18/2024     9:26 AM   PHQ-9    Little interest or pleasure in doing things 0   Feeling down, depressed, or hopeless 0   PHQ-2 Score 0   PHQ-9 Total Score 0           2/15/2024     8:30 AM 8/21/2023     9:10 AM 12/5/2022    12:00 AM   [facility] AMB LEARNING ASSESSMENT   Primary Learner Patient Patient Patient   Primary Language ENGLISH ENGLISH ENGLISH   Learning Preference DEMONSTRATION DEMONSTRATION DEMONSTRATION   Answered By patient patient self   Relationship to Learner SELF SELF SELF            7/18/2024     9:26 AM   Amb Fall Risk Assessment and TUG Test   Do you feel unsteady or are you worried about falling?  no   2 or more falls in past year? no   Fall with injury in past year? no           7/18/2024     9:00 AM 4/29/2024     8:00 AM 2/15/2024     8:00 AM 8/21/2023     9:00 AM   ADL ASSESSMENT   Feeding yourself No Help Needed No Help Needed No Help Needed No Help Needed   Getting

## 2024-07-28 LAB — NONINV COLON CA DNA+OCC BLD SCRN STL QL: NEGATIVE

## 2024-10-11 DIAGNOSIS — E78.2 MIXED HYPERLIPIDEMIA: ICD-10-CM

## 2024-10-12 RX ORDER — SIMVASTATIN 10 MG
10 TABLET ORAL NIGHTLY
Qty: 90 TABLET | Refills: 3 | Status: SHIPPED | OUTPATIENT
Start: 2024-10-12

## 2025-02-06 SDOH — HEALTH STABILITY: PHYSICAL HEALTH: ON AVERAGE, HOW MANY DAYS PER WEEK DO YOU ENGAGE IN MODERATE TO STRENUOUS EXERCISE (LIKE A BRISK WALK)?: 5 DAYS

## 2025-02-06 SDOH — HEALTH STABILITY: PHYSICAL HEALTH: ON AVERAGE, HOW MANY MINUTES DO YOU ENGAGE IN EXERCISE AT THIS LEVEL?: 60 MIN

## 2025-02-06 ASSESSMENT — PATIENT HEALTH QUESTIONNAIRE - PHQ9
SUM OF ALL RESPONSES TO PHQ QUESTIONS 1-9: 0
1. LITTLE INTEREST OR PLEASURE IN DOING THINGS: NOT AT ALL
SUM OF ALL RESPONSES TO PHQ QUESTIONS 1-9: 0
2. FEELING DOWN, DEPRESSED OR HOPELESS: NOT AT ALL
SUM OF ALL RESPONSES TO PHQ QUESTIONS 1-9: 0
SUM OF ALL RESPONSES TO PHQ9 QUESTIONS 1 & 2: 0
SUM OF ALL RESPONSES TO PHQ QUESTIONS 1-9: 0

## 2025-02-06 ASSESSMENT — LIFESTYLE VARIABLES
HOW MANY STANDARD DRINKS CONTAINING ALCOHOL DO YOU HAVE ON A TYPICAL DAY: PATIENT DOES NOT DRINK
HOW OFTEN DO YOU HAVE A DRINK CONTAINING ALCOHOL: NEVER
HOW OFTEN DO YOU HAVE A DRINK CONTAINING ALCOHOL: 1
HOW MANY STANDARD DRINKS CONTAINING ALCOHOL DO YOU HAVE ON A TYPICAL DAY: 0
HOW OFTEN DO YOU HAVE SIX OR MORE DRINKS ON ONE OCCASION: 1

## 2025-02-10 ENCOUNTER — TELEPHONE (OUTPATIENT)
Age: 75
End: 2025-02-10

## 2025-02-10 ENCOUNTER — OFFICE VISIT (OUTPATIENT)
Age: 75
End: 2025-02-10
Payer: MEDICARE

## 2025-02-10 VITALS
TEMPERATURE: 98.2 F | SYSTOLIC BLOOD PRESSURE: 105 MMHG | HEIGHT: 70 IN | RESPIRATION RATE: 18 BRPM | BODY MASS INDEX: 23.48 KG/M2 | HEART RATE: 80 BPM | DIASTOLIC BLOOD PRESSURE: 61 MMHG | WEIGHT: 164 LBS | OXYGEN SATURATION: 98 %

## 2025-02-10 DIAGNOSIS — R73.09 ELEVATED GLUCOSE: ICD-10-CM

## 2025-02-10 DIAGNOSIS — Z12.5 SCREENING FOR PROSTATE CANCER: ICD-10-CM

## 2025-02-10 DIAGNOSIS — I10 ESSENTIAL (PRIMARY) HYPERTENSION: ICD-10-CM

## 2025-02-10 DIAGNOSIS — E78.2 MIXED HYPERLIPIDEMIA: ICD-10-CM

## 2025-02-10 DIAGNOSIS — Z00.00 MEDICARE ANNUAL WELLNESS VISIT, SUBSEQUENT: Primary | ICD-10-CM

## 2025-02-10 PROCEDURE — 1126F AMNT PAIN NOTED NONE PRSNT: CPT | Performed by: INTERNAL MEDICINE

## 2025-02-10 PROCEDURE — 3074F SYST BP LT 130 MM HG: CPT | Performed by: INTERNAL MEDICINE

## 2025-02-10 PROCEDURE — G0439 PPPS, SUBSEQ VISIT: HCPCS | Performed by: INTERNAL MEDICINE

## 2025-02-10 PROCEDURE — G2211 COMPLEX E/M VISIT ADD ON: HCPCS | Performed by: INTERNAL MEDICINE

## 2025-02-10 PROCEDURE — 1123F ACP DISCUSS/DSCN MKR DOCD: CPT | Performed by: INTERNAL MEDICINE

## 2025-02-10 PROCEDURE — G8427 DOCREV CUR MEDS BY ELIG CLIN: HCPCS | Performed by: INTERNAL MEDICINE

## 2025-02-10 PROCEDURE — 3078F DIAST BP <80 MM HG: CPT | Performed by: INTERNAL MEDICINE

## 2025-02-10 PROCEDURE — 3017F COLORECTAL CA SCREEN DOC REV: CPT | Performed by: INTERNAL MEDICINE

## 2025-02-10 PROCEDURE — 1159F MED LIST DOCD IN RCRD: CPT | Performed by: INTERNAL MEDICINE

## 2025-02-10 PROCEDURE — 1036F TOBACCO NON-USER: CPT | Performed by: INTERNAL MEDICINE

## 2025-02-10 PROCEDURE — G8420 CALC BMI NORM PARAMETERS: HCPCS | Performed by: INTERNAL MEDICINE

## 2025-02-10 PROCEDURE — 99214 OFFICE O/P EST MOD 30 MIN: CPT | Performed by: INTERNAL MEDICINE

## 2025-02-10 SDOH — ECONOMIC STABILITY: FOOD INSECURITY: WITHIN THE PAST 12 MONTHS, THE FOOD YOU BOUGHT JUST DIDN'T LAST AND YOU DIDN'T HAVE MONEY TO GET MORE.: NEVER TRUE

## 2025-02-10 SDOH — ECONOMIC STABILITY: FOOD INSECURITY: WITHIN THE PAST 12 MONTHS, YOU WORRIED THAT YOUR FOOD WOULD RUN OUT BEFORE YOU GOT MONEY TO BUY MORE.: NEVER TRUE

## 2025-02-10 NOTE — PROGRESS NOTES
Duration 30 minutes primarily education, review of imaging, labs and records.    Assessment & Plan  1. Blood pressure management.  His blood pressure readings are within the optimal range, with a systolic value of 105 and a diastolic value of 61. The cessation of alcohol consumption and a weight loss of 5 pounds have likely contributed to this improvement. It is also possible that his blood pressure may exhibit seasonal variations. He has been advised to discontinue olmesartan for a period of 1 to 2 weeks and monitor his blood pressure during this time. If the readings remain between 115 and 125, he should maintain this regimen. However, if he experiences symptoms such as sluggishness, poor sleep, headaches, or an increase in blood pressure, he should resume the medication. If his blood pressure increases to the 135 or 140 range, he should recommence the medication at a reduced dosage of half a tablet.    2. Cholesterol management.  He continues to take simvastatin for cholesterol management. He reports no side effects from the medication.    3. Reflux.  His reflux symptoms have significantly improved after dietary changes, including eating supper earlier, avoiding spicy foods, and stopping alcohol consumption.    4. Left quadriceps tightness.  He reports a history of remaining tightness in his left quadriceps associated with sciatica since April last year. He continues to perform physical therapy exercises as recommended.    5. Health maintenance.  A blood draw will be conducted today for routine health maintenance.    Follow-up  The patient will follow up in 1 year.          Chief Complaint   Patient presents with    Medicare AWV    Immunizations     VIIS Verified    Hypertension         Orders Placed This Encounter   Procedures    CBC with Auto Differential    Comprehensive Metabolic Panel    Hemoglobin A1C    Lipid Panel    TSH    Albumin/Creatinine Ratio, Urine    PSA Screening       Elías Buchanan MD, 
Medicare Annual Wellness Visit    Ministerio Brock is here for Medicare AWV, Immunizations (VIIS Verified), and Hypertension    Assessment & Plan   Medicare annual wellness visit, subsequent  Essential (primary) hypertension  -     CBC with Auto Differential  -     Comprehensive Metabolic Panel  -     TSH  -     Albumin/Creatinine Ratio, Urine  Elevated glucose  -     Hemoglobin A1C  -     Albumin/Creatinine Ratio, Urine  Mixed hyperlipidemia  -     Lipid Panel  -     Albumin/Creatinine Ratio, Urine  Screening for prostate cancer  -     PSA Screening       No follow-ups on file.     Subjective       Patient's complete Health Risk Assessment and screening values have been reviewed and are found in Flowsheets. The following problems were reviewed today and where indicated follow up appointments were made and/or referrals ordered.    No Positive Risk Factors identified today.                                    Objective   Vitals:    02/10/25 0802   BP: 105/61   Site: Left Upper Arm   Position: Sitting   Cuff Size: Medium Adult   Pulse: 80   Resp: 18   Temp: 98.2 °F (36.8 °C)   TempSrc: Temporal   SpO2: 98%   Weight: 74.4 kg (164 lb)   Height: 1.778 m (5' 10\")      Body mass index is 23.53 kg/m².                  No Known Allergies  Prior to Visit Medications    Medication Sig Taking? Authorizing Provider   Famotidine (PEPCID PO) Take by mouth Yes Provider, MD Sixto   simvastatin (ZOCOR) 10 MG tablet TAKE 1 TABLET BY MOUTH EVERY  NIGHT Yes Elías Buchanan MD   olmesartan (BENICAR) 20 MG tablet TAKE 1 TABLET BY MOUTH AT NIGHT  FOR HIGH BLOOD PRESSURE Yes Elías Buchanan MD   glucosamine-chondroitin 750-600 MG TABS tablet Take by mouth Yes Automatic Reconciliation, Ar       CareTeam (Including outside providers/suppliers regularly involved in providing care):   Patient Care Team:  Elías Buchanan MD as PCP - General  Elías Buchanan MD as PCP - Empaneled Provider     Recommendations for Preventive 
(includes cane/walker) No Help Needed No Help Needed No Help Needed No Help Needed   Using the telphone No Help Needed No Help Needed No Help Needed No Help Needed   Taking your medications No Help Needed No Help Needed No Help Needed No Help Needed   Preparing meals No Help Needed No Help Needed No Help Needed No Help Needed   Managing money (expenses/bills) No Help Needed No Help Needed No Help Needed No Help Needed   Moderately strenuous housework (laundry) No Help Needed No Help Needed No Help Needed No Help Needed   Shopping for personal items (toiletries/medicines) No Help Needed No Help Needed No Help Needed No Help Needed   Shopping for groceries No Help Needed No Help Needed No Help Needed No Help Needed   Driving No Help Needed No Help Needed No Help Needed No Help Needed   Climbing a flight of stairs No Help Needed No Help Needed No Help Needed No Help Needed   Getting to places beyond walking distances No Help Needed No Help Needed No Help Needed No Help Needed           7/18/2024     9:00 AM   AMB Abuse Screening   Do you ever feel afraid of your partner? N   Are you in a relationship with someone who physically or mentally threatens you? N   Is it safe for you to go home? Y       Advance Care Planning     The patient has appointed the following active healthcare agents:    Primary Decision Maker: Mari Brock - [facility] - [phone]

## 2025-02-12 LAB
ALBUMIN SERPL-MCNC: 4.4 G/DL (ref 3.5–5)
ALBUMIN/GLOB SERPL: 1.4 (ref 1.1–2.2)
ALP SERPL-CCNC: 81 U/L (ref 45–117)
ALT SERPL-CCNC: 22 U/L (ref 12–78)
ANION GAP SERPL CALC-SCNC: 8 MMOL/L (ref 2–12)
AST SERPL-CCNC: 22 U/L (ref 15–37)
BASOPHILS # BLD: 0.03 K/UL (ref 0–0.1)
BASOPHILS NFR BLD: 0.4 % (ref 0–1)
BILIRUB SERPL-MCNC: 0.4 MG/DL (ref 0.2–1)
BUN SERPL-MCNC: 13 MG/DL (ref 6–20)
BUN/CREAT SERPL: 12 (ref 12–20)
CALCIUM SERPL-MCNC: 9.7 MG/DL (ref 8.5–10.1)
CHLORIDE SERPL-SCNC: 102 MMOL/L (ref 97–108)
CHOLEST SERPL-MCNC: 156 MG/DL
CO2 SERPL-SCNC: 27 MMOL/L (ref 21–32)
CREAT SERPL-MCNC: 1.12 MG/DL (ref 0.7–1.3)
CREAT UR-MCNC: 132 MG/DL
DIFFERENTIAL METHOD BLD: NORMAL
EOSINOPHIL # BLD: 0.2 K/UL (ref 0–0.4)
EOSINOPHIL NFR BLD: 2.8 % (ref 0–7)
ERYTHROCYTE [DISTWIDTH] IN BLOOD BY AUTOMATED COUNT: 13.2 % (ref 11.5–14.5)
EST. AVERAGE GLUCOSE BLD GHB EST-MCNC: 108 MG/DL
GLOBULIN SER CALC-MCNC: 3.2 G/DL (ref 2–4)
GLUCOSE SERPL-MCNC: 93 MG/DL (ref 65–100)
HBA1C MFR BLD: 5.4 % (ref 4–5.6)
HCT VFR BLD AUTO: 41.8 % (ref 36.6–50.3)
HDLC SERPL-MCNC: 48 MG/DL
HDLC SERPL: 3.3 (ref 0–5)
HGB BLD-MCNC: 13.6 G/DL (ref 12.1–17)
IMM GRANULOCYTES # BLD AUTO: 0.01 K/UL (ref 0–0.04)
IMM GRANULOCYTES NFR BLD AUTO: 0.1 % (ref 0–0.5)
LDLC SERPL CALC-MCNC: 88.4 MG/DL (ref 0–100)
LYMPHOCYTES # BLD: 2.14 K/UL (ref 0.8–3.5)
LYMPHOCYTES NFR BLD: 29.5 % (ref 12–49)
MCH RBC QN AUTO: 31.9 PG (ref 26–34)
MCHC RBC AUTO-ENTMCNC: 32.5 G/DL (ref 30–36.5)
MCV RBC AUTO: 97.9 FL (ref 80–99)
MICROALBUMIN UR-MCNC: 0.75 MG/DL
MICROALBUMIN/CREAT UR-RTO: 6 MG/G (ref 0–30)
MONOCYTES # BLD: 0.64 K/UL (ref 0–1)
MONOCYTES NFR BLD: 8.8 % (ref 5–13)
NEUTS SEG # BLD: 4.24 K/UL (ref 1.8–8)
NEUTS SEG NFR BLD: 58.4 % (ref 32–75)
NRBC # BLD: 0 K/UL (ref 0–0.01)
NRBC BLD-RTO: 0 PER 100 WBC
PLATELET # BLD AUTO: 291 K/UL (ref 150–400)
PMV BLD AUTO: 10.5 FL (ref 8.9–12.9)
POTASSIUM SERPL-SCNC: 4.4 MMOL/L (ref 3.5–5.1)
PROT SERPL-MCNC: 7.6 G/DL (ref 6.4–8.2)
PSA SERPL-MCNC: 1.2 NG/ML (ref 0.01–4)
RBC # BLD AUTO: 4.27 M/UL (ref 4.1–5.7)
SODIUM SERPL-SCNC: 137 MMOL/L (ref 136–145)
TRIGL SERPL-MCNC: 98 MG/DL
TSH SERPL DL<=0.05 MIU/L-ACNC: 3.78 UIU/ML (ref 0.36–3.74)
VLDLC SERPL CALC-MCNC: 19.6 MG/DL
WBC # BLD AUTO: 7.3 K/UL (ref 4.1–11.1)

## 2025-02-19 DIAGNOSIS — K64.1 GRADE II HEMORRHOIDS: Primary | ICD-10-CM

## 2025-02-19 RX ORDER — HYDROCORTISONE ACETATE 25 MG/1
25 SUPPOSITORY RECTAL EVERY 12 HOURS
Qty: 12 SUPPOSITORY | Refills: 1 | Status: SHIPPED | OUTPATIENT
Start: 2025-02-19

## 2025-02-20 RX ORDER — OLMESARTAN MEDOXOMIL 20 MG/1
TABLET ORAL
Qty: 90 TABLET | Refills: 3 | Status: SHIPPED | OUTPATIENT
Start: 2025-02-20

## 2025-08-20 DIAGNOSIS — E78.2 MIXED HYPERLIPIDEMIA: ICD-10-CM

## 2025-08-21 RX ORDER — SIMVASTATIN 10 MG
10 TABLET ORAL NIGHTLY
Qty: 90 TABLET | Refills: 3 | Status: SHIPPED | OUTPATIENT
Start: 2025-08-21